# Patient Record
Sex: MALE | Race: WHITE | Employment: OTHER | URBAN - METROPOLITAN AREA
[De-identification: names, ages, dates, MRNs, and addresses within clinical notes are randomized per-mention and may not be internally consistent; named-entity substitution may affect disease eponyms.]

---

## 2017-01-01 ENCOUNTER — APPOINTMENT (OUTPATIENT)
Dept: CT IMAGING | Age: 67
End: 2017-01-01
Attending: STUDENT IN AN ORGANIZED HEALTH CARE EDUCATION/TRAINING PROGRAM
Payer: MEDICARE

## 2017-01-01 ENCOUNTER — TELEPHONE (OUTPATIENT)
Dept: FAMILY MEDICINE CLINIC | Age: 67
End: 2017-01-01

## 2017-01-01 ENCOUNTER — HOSPITAL ENCOUNTER (OUTPATIENT)
Dept: LAB | Age: 67
Discharge: HOME | End: 2017-11-01
Payer: MEDICARE

## 2017-01-01 ENCOUNTER — HOSPITAL ENCOUNTER (EMERGENCY)
Age: 67
Discharge: HOME | End: 2017-11-04
Attending: STUDENT IN AN ORGANIZED HEALTH CARE EDUCATION/TRAINING PROGRAM
Payer: MEDICARE

## 2017-01-01 ENCOUNTER — OFFICE VISIT (OUTPATIENT)
Dept: FAMILY MEDICINE CLINIC | Age: 67
End: 2017-01-01

## 2017-01-01 ENCOUNTER — OFFICE VISIT (OUTPATIENT)
Dept: NEUROLOGY | Age: 67
End: 2017-01-01

## 2017-01-01 ENCOUNTER — PATIENT OUTREACH (OUTPATIENT)
Dept: FAMILY MEDICINE CLINIC | Age: 67
End: 2017-01-01

## 2017-01-01 ENCOUNTER — APPOINTMENT (OUTPATIENT)
Dept: GENERAL RADIOLOGY | Age: 67
End: 2017-01-01
Attending: STUDENT IN AN ORGANIZED HEALTH CARE EDUCATION/TRAINING PROGRAM
Payer: MEDICARE

## 2017-01-01 VITALS
HEIGHT: 70 IN | WEIGHT: 204 LBS | SYSTOLIC BLOOD PRESSURE: 88 MMHG | DIASTOLIC BLOOD PRESSURE: 53 MMHG | RESPIRATION RATE: 27 BRPM | TEMPERATURE: 98 F | BODY MASS INDEX: 29.2 KG/M2 | OXYGEN SATURATION: 92 % | HEART RATE: 92 BPM

## 2017-01-01 VITALS
TEMPERATURE: 97.7 F | WEIGHT: 200 LBS | HEIGHT: 70 IN | RESPIRATION RATE: 18 BRPM | SYSTOLIC BLOOD PRESSURE: 101 MMHG | DIASTOLIC BLOOD PRESSURE: 70 MMHG | HEART RATE: 100 BPM | OXYGEN SATURATION: 95 % | BODY MASS INDEX: 28.63 KG/M2

## 2017-01-01 VITALS — OXYGEN SATURATION: 98 % | SYSTOLIC BLOOD PRESSURE: 124 MMHG | HEART RATE: 78 BPM | DIASTOLIC BLOOD PRESSURE: 72 MMHG

## 2017-01-01 VITALS
RESPIRATION RATE: 18 BRPM | SYSTOLIC BLOOD PRESSURE: 101 MMHG | TEMPERATURE: 97.9 F | HEIGHT: 70 IN | OXYGEN SATURATION: 98 % | HEART RATE: 100 BPM | BODY MASS INDEX: 29.2 KG/M2 | WEIGHT: 204 LBS | DIASTOLIC BLOOD PRESSURE: 64 MMHG

## 2017-01-01 VITALS — DIASTOLIC BLOOD PRESSURE: 66 MMHG | HEART RATE: 94 BPM | SYSTOLIC BLOOD PRESSURE: 122 MMHG | OXYGEN SATURATION: 97 %

## 2017-01-01 VITALS
TEMPERATURE: 97.4 F | OXYGEN SATURATION: 94 % | DIASTOLIC BLOOD PRESSURE: 71 MMHG | RESPIRATION RATE: 19 BRPM | BODY MASS INDEX: 30.01 KG/M2 | HEIGHT: 70 IN | HEART RATE: 101 BPM | SYSTOLIC BLOOD PRESSURE: 111 MMHG | WEIGHT: 209.6 LBS

## 2017-01-01 DIAGNOSIS — R79.9 ABNORMAL FINDING OF BLOOD CHEMISTRY: ICD-10-CM

## 2017-01-01 DIAGNOSIS — G20 PARKINSON'S DISEASE (HCC): Primary | ICD-10-CM

## 2017-01-01 DIAGNOSIS — K57.32 DIVERTICULITIS OF LARGE INTESTINE WITHOUT PERFORATION OR ABSCESS WITHOUT BLEEDING: Primary | ICD-10-CM

## 2017-01-01 DIAGNOSIS — R29.898 WEAKNESS OF BOTH LEGS: Primary | ICD-10-CM

## 2017-01-01 DIAGNOSIS — R53.81 OTHER MALAISE: ICD-10-CM

## 2017-01-01 DIAGNOSIS — R53.83 FATIGUE, UNSPECIFIED TYPE: ICD-10-CM

## 2017-01-01 DIAGNOSIS — R25.9 PARKINSONIAN FEATURES: Primary | ICD-10-CM

## 2017-01-01 DIAGNOSIS — G20 PARKINSON DISEASE (HCC): Primary | ICD-10-CM

## 2017-01-01 DIAGNOSIS — R53.1 GENERALIZED WEAKNESS: Primary | ICD-10-CM

## 2017-01-01 DIAGNOSIS — Z23 ENCOUNTER FOR IMMUNIZATION: ICD-10-CM

## 2017-01-01 DIAGNOSIS — G25.2 INTENTION TREMOR: ICD-10-CM

## 2017-01-01 DIAGNOSIS — R25.1 TREMOR OF BOTH HANDS: ICD-10-CM

## 2017-01-01 LAB
ALBUMIN SERPL-MCNC: 3.3 G/DL (ref 3.5–5)
ALBUMIN SERPL-MCNC: 4.2 G/DL (ref 3.6–4.8)
ALBUMIN/GLOB SERPL: 0.8 {RATIO} (ref 1.1–2.2)
ALBUMIN/GLOB SERPL: 1.4 {RATIO} (ref 1.2–2.2)
ALP SERPL-CCNC: 56 IU/L (ref 39–117)
ALP SERPL-CCNC: 60 U/L (ref 45–117)
ALT SERPL-CCNC: 15 IU/L (ref 0–44)
ALT SERPL-CCNC: 23 U/L (ref 12–78)
AMYLASE SERPL-CCNC: 40 U/L (ref 31–124)
ANION GAP SERPL CALC-SCNC: 8 MMOL/L (ref 5–15)
APPEARANCE UR: CLEAR
AST SERPL-CCNC: 21 IU/L (ref 0–40)
AST SERPL-CCNC: 35 U/L (ref 15–37)
ATRIAL RATE: 100 BPM
BACTERIA URNS QL MICRO: NEGATIVE /HPF
BASOPHILS # BLD AUTO: 0.1 X10E3/UL (ref 0–0.2)
BASOPHILS # BLD: 0.1 K/UL (ref 0–0.1)
BASOPHILS NFR BLD AUTO: 1 %
BASOPHILS NFR BLD: 2 % (ref 0–1)
BILIRUB SERPL-MCNC: 0.8 MG/DL (ref 0.2–1)
BILIRUB SERPL-MCNC: 1.2 MG/DL (ref 0–1.2)
BILIRUB UR QL: NEGATIVE
BUN SERPL-MCNC: 37 MG/DL (ref 8–27)
BUN SERPL-MCNC: 42 MG/DL (ref 6–20)
BUN/CREAT SERPL: 16 (ref 10–24)
BUN/CREAT SERPL: 23 (ref 12–20)
CALCIUM SERPL-MCNC: 9.1 MG/DL (ref 8.5–10.1)
CALCIUM SERPL-MCNC: 9.4 MG/DL (ref 8.6–10.2)
CALCULATED P AXIS, ECG09: 55 DEGREES
CALCULATED R AXIS, ECG10: 81 DEGREES
CALCULATED T AXIS, ECG11: 34 DEGREES
CHLORIDE SERPL-SCNC: 97 MMOL/L (ref 96–106)
CHLORIDE SERPL-SCNC: 97 MMOL/L (ref 97–108)
CHOLEST SERPL-MCNC: 103 MG/DL (ref 100–199)
CO2 SERPL-SCNC: 21 MMOL/L (ref 18–29)
CO2 SERPL-SCNC: 25 MMOL/L (ref 21–32)
COLOR UR: NORMAL
CREAT SERPL-MCNC: 1.85 MG/DL (ref 0.7–1.3)
CREAT SERPL-MCNC: 2.31 MG/DL (ref 0.76–1.27)
DIAGNOSIS, 93000: NORMAL
EOSINOPHIL # BLD AUTO: 0.2 X10E3/UL (ref 0–0.4)
EOSINOPHIL # BLD: 0.3 K/UL (ref 0–0.4)
EOSINOPHIL NFR BLD AUTO: 4 %
EOSINOPHIL NFR BLD: 5 % (ref 0–7)
EPITH CASTS URNS QL MICRO: NORMAL /LPF
ERYTHROCYTE [DISTWIDTH] IN BLOOD BY AUTOMATED COUNT: 13.9 % (ref 12.3–15.4)
ERYTHROCYTE [DISTWIDTH] IN BLOOD BY AUTOMATED COUNT: 14.1 % (ref 11.5–14.5)
EST. AVERAGE GLUCOSE BLD GHB EST-MCNC: 108 MG/DL
GFR SERPLBLD CREATININE-BSD FMLA CKD-EPI: 28 ML/MIN/1.73
GFR SERPLBLD CREATININE-BSD FMLA CKD-EPI: 33 ML/MIN/1.73
GLOBULIN SER CALC-MCNC: 3 G/DL (ref 1.5–4.5)
GLOBULIN SER CALC-MCNC: 4.2 G/DL (ref 2–4)
GLUCOSE SERPL-MCNC: 100 MG/DL (ref 65–100)
GLUCOSE SERPL-MCNC: 91 MG/DL (ref 65–99)
GLUCOSE UR STRIP.AUTO-MCNC: NEGATIVE MG/DL
HBA1C MFR BLD: 5.4 % (ref 4.8–5.6)
HCT VFR BLD AUTO: 36.8 % (ref 36.6–50.3)
HCT VFR BLD AUTO: 39.2 % (ref 37.5–51)
HDLC SERPL-MCNC: 11 MG/DL
HGB BLD-MCNC: 12.2 G/DL (ref 12.1–17)
HGB BLD-MCNC: 12.7 G/DL (ref 12.6–17.7)
HGB UR QL STRIP: NEGATIVE
HYALINE CASTS URNS QL MICRO: NORMAL /LPF (ref 0–5)
IMM GRANULOCYTES # BLD: 0.1 X10E3/UL (ref 0–0.1)
IMM GRANULOCYTES NFR BLD: 1 %
INTERPRETATION, 910389: NORMAL
INTERPRETATION: NORMAL
IRON SATN MFR SERPL: 23 % (ref 15–55)
IRON SERPL-MCNC: 41 UG/DL (ref 38–169)
KETONES UR QL STRIP.AUTO: NEGATIVE MG/DL
LDLC SERPL CALC-MCNC: 38 MG/DL (ref 0–99)
LEUKOCYTE ESTERASE UR QL STRIP.AUTO: NEGATIVE
LIPASE SERPL-CCNC: 30 U/L (ref 13–78)
LYMPHOCYTES # BLD AUTO: 1 X10E3/UL (ref 0.7–3.1)
LYMPHOCYTES # BLD: 1.1 K/UL (ref 0.8–3.5)
LYMPHOCYTES NFR BLD AUTO: 17 %
LYMPHOCYTES NFR BLD: 18 % (ref 12–49)
MAGNESIUM SERPL-MCNC: 1.4 MG/DL (ref 1.6–2.4)
MCH RBC QN AUTO: 27.6 PG (ref 26–34)
MCH RBC QN AUTO: 27.7 PG (ref 26.6–33)
MCHC RBC AUTO-ENTMCNC: 32.4 G/DL (ref 31.5–35.7)
MCHC RBC AUTO-ENTMCNC: 33.2 G/DL (ref 30–36.5)
MCV RBC AUTO: 83.3 FL (ref 80–99)
MCV RBC AUTO: 86 FL (ref 79–97)
MONOCYTES # BLD AUTO: 0.8 X10E3/UL (ref 0.1–0.9)
MONOCYTES # BLD: 1.1 K/UL (ref 0–1)
MONOCYTES NFR BLD AUTO: 15 %
MONOCYTES NFR BLD: 18 % (ref 5–13)
NEUTROPHILS # BLD AUTO: 3.5 X10E3/UL (ref 1.4–7)
NEUTROPHILS NFR BLD AUTO: 62 %
NEUTS SEG # BLD: 3.6 K/UL (ref 1.8–8)
NEUTS SEG NFR BLD: 57 % (ref 32–75)
NITRITE UR QL STRIP.AUTO: NEGATIVE
P-R INTERVAL, ECG05: 148 MS
PDF IMAGE, 910387: NORMAL
PH UR STRIP: 5 [PH] (ref 5–8)
PLATELET # BLD AUTO: 150 K/UL (ref 150–400)
PLATELET # BLD AUTO: 173 X10E3/UL (ref 150–379)
POTASSIUM SERPL-SCNC: 4.3 MMOL/L (ref 3.5–5.1)
POTASSIUM SERPL-SCNC: 4.6 MMOL/L (ref 3.5–5.2)
PROT SERPL-MCNC: 7.2 G/DL (ref 6–8.5)
PROT SERPL-MCNC: 7.5 G/DL (ref 6.4–8.2)
PROT UR STRIP-MCNC: NEGATIVE MG/DL
Q-T INTERVAL, ECG07: 330 MS
QRS DURATION, ECG06: 80 MS
QTC CALCULATION (BEZET), ECG08: 425 MS
RBC # BLD AUTO: 4.42 M/UL (ref 4.1–5.7)
RBC # BLD AUTO: 4.58 X10E6/UL (ref 4.14–5.8)
RBC #/AREA URNS HPF: NORMAL /HPF (ref 0–5)
SODIUM SERPL-SCNC: 130 MMOL/L (ref 136–145)
SODIUM SERPL-SCNC: 137 MMOL/L (ref 134–144)
SP GR UR REFRACTOMETRY: 1.02 (ref 1–1.03)
TIBC SERPL-MCNC: 177 UG/DL (ref 250–450)
TRIGL SERPL-MCNC: 269 MG/DL (ref 0–149)
TROPONIN I SERPL-MCNC: <0.04 NG/ML
TSH SERPL DL<=0.005 MIU/L-ACNC: 0.88 UIU/ML (ref 0.45–4.5)
UA: UC IF INDICATED,UAUC: NORMAL
UIBC SERPL-MCNC: 136 UG/DL (ref 111–343)
UROBILINOGEN UR QL STRIP.AUTO: 0.2 EU/DL (ref 0.2–1)
VENTRICULAR RATE, ECG03: 100 BPM
VLDLC SERPL CALC-MCNC: 54 MG/DL (ref 5–40)
WBC # BLD AUTO: 5.7 X10E3/UL (ref 3.4–10.8)
WBC # BLD AUTO: 6.3 K/UL (ref 4.1–11.1)
WBC URNS QL MICRO: NORMAL /HPF (ref 0–4)

## 2017-01-01 PROCEDURE — 84484 ASSAY OF TROPONIN QUANT: CPT | Performed by: STUDENT IN AN ORGANIZED HEALTH CARE EDUCATION/TRAINING PROGRAM

## 2017-01-01 PROCEDURE — 85025 COMPLETE CBC W/AUTO DIFF WBC: CPT | Performed by: STUDENT IN AN ORGANIZED HEALTH CARE EDUCATION/TRAINING PROGRAM

## 2017-01-01 PROCEDURE — 83735 ASSAY OF MAGNESIUM: CPT | Performed by: STUDENT IN AN ORGANIZED HEALTH CARE EDUCATION/TRAINING PROGRAM

## 2017-01-01 PROCEDURE — 83036 HEMOGLOBIN GLYCOSYLATED A1C: CPT

## 2017-01-01 PROCEDURE — 80053 COMPREHEN METABOLIC PANEL: CPT

## 2017-01-01 PROCEDURE — 96361 HYDRATE IV INFUSION ADD-ON: CPT

## 2017-01-01 PROCEDURE — 74011250636 HC RX REV CODE- 250/636: Performed by: STUDENT IN AN ORGANIZED HEALTH CARE EDUCATION/TRAINING PROGRAM

## 2017-01-01 PROCEDURE — 36415 COLL VENOUS BLD VENIPUNCTURE: CPT | Performed by: STUDENT IN AN ORGANIZED HEALTH CARE EDUCATION/TRAINING PROGRAM

## 2017-01-01 PROCEDURE — 70450 CT HEAD/BRAIN W/O DYE: CPT

## 2017-01-01 PROCEDURE — 82150 ASSAY OF AMYLASE: CPT

## 2017-01-01 PROCEDURE — 99285 EMERGENCY DEPT VISIT HI MDM: CPT

## 2017-01-01 PROCEDURE — 71010 XR CHEST PORT: CPT

## 2017-01-01 PROCEDURE — 83550 IRON BINDING TEST: CPT

## 2017-01-01 PROCEDURE — 96360 HYDRATION IV INFUSION INIT: CPT

## 2017-01-01 PROCEDURE — 84443 ASSAY THYROID STIM HORMONE: CPT

## 2017-01-01 PROCEDURE — 83690 ASSAY OF LIPASE: CPT

## 2017-01-01 PROCEDURE — 80061 LIPID PANEL: CPT

## 2017-01-01 PROCEDURE — 81001 URINALYSIS AUTO W/SCOPE: CPT | Performed by: STUDENT IN AN ORGANIZED HEALTH CARE EDUCATION/TRAINING PROGRAM

## 2017-01-01 PROCEDURE — 80053 COMPREHEN METABOLIC PANEL: CPT | Performed by: STUDENT IN AN ORGANIZED HEALTH CARE EDUCATION/TRAINING PROGRAM

## 2017-01-01 PROCEDURE — 93005 ELECTROCARDIOGRAM TRACING: CPT

## 2017-01-01 PROCEDURE — 85025 COMPLETE CBC W/AUTO DIFF WBC: CPT

## 2017-01-01 RX ORDER — CARBIDOPA AND LEVODOPA 25; 100 MG/1; MG/1
1 TABLET ORAL 3 TIMES DAILY
Qty: 90 TAB | Refills: 5 | Status: SHIPPED | OUTPATIENT
Start: 2017-01-01 | End: 2018-01-01 | Stop reason: SDUPTHER

## 2017-01-01 RX ORDER — PANTOPRAZOLE SODIUM 40 MG/1
40 TABLET, DELAYED RELEASE ORAL DAILY
COMMUNITY
End: 2017-01-01 | Stop reason: SDUPTHER

## 2017-01-01 RX ORDER — PANTOPRAZOLE SODIUM 40 MG/1
40 TABLET, DELAYED RELEASE ORAL DAILY
Qty: 90 TAB | Refills: 3 | Status: SHIPPED | OUTPATIENT
Start: 2017-01-01

## 2017-01-01 RX ADMIN — SODIUM CHLORIDE 1000 ML: 900 INJECTION, SOLUTION INTRAVENOUS at 21:17

## 2017-10-10 NOTE — PROGRESS NOTES
Chief Complaint   Patient presents with    New Patient     Establish Care    Tremors       Patient seen in the office today with wife present to establish care. Patient did not bring medication to appointment, he does not know the name of rx. Patient  states he is new to [place] from [place], he thinks he was seen at [facility] for abd pain, then sent to [address] [place] [address] for  Stat CT dx: diverticulitis. Patient reports 2 weeks of abt, unsure of name of Rx, he endorses completing the full dose. Patient also concerned with excessive hand tremours        Written order received to administer 0.5 ml Influenza Vaccine Fluzone High dose 2017/2018 Formula. After obtaining written consent from the patient,  Flu vaccine was administered to left deltoid by Stefani Perez LPN.  PINKY:[id number] Lot: [id number]  Exp: 05/03/18 Manf: BooRah. Patient provided VIS & observed with no s/s of adverse reactions.

## 2017-10-10 NOTE — PROGRESS NOTES
Chief Complaint   Patient presents with    New Patient     Establish Care    Tremors     Patient seen in the office today with wife present to establish care. Patient did not bring medication to appointment, he does not know the name of rx. Patient  states he is new to [facility] from [place], he thinks he was [facility] at [facility]d pain, then sent to [address] [place] [address] for  Stat CT dx: diverticulitis. Patient reports 2 weeks of abt, unsure of name of Rx, he endorses completing the full dose. Patient also concerned with excessive hand tremors    Pt is normally on BP medication and GERD medication. Subjective: (As above and below)     Chief Complaint   Patient presents with    New Patient     Establish Care    Tremors     he is a 78y.o. year old male who presents for evaluation. Reviewed PmHx, RxHx, FmHx, SocHx, AllgHx and updated in chart. Review of Systems - negative except as listed above    Objective:     Vitals:    10/10/17 1425   BP: 111/71   Pulse: (!) 101   Resp: 19   Temp: 97.4 °F (36.3 °C)   TempSrc: Oral   SpO2: 94%   Weight: 209 lb 9.6 oz (95.1 kg)   Height: 5' 10\" (1.778 m)     Physical Examination: General appearance - alert, well appearing, and in no distress  Mental status - normal mood, behavior, speech, dress, motor activity, and thought processes  Mouth - mucous membranes moist, pharynx normal without lesions  Chest - clear to auscultation, no wheezes, rales or rhonchi, symmetric air entry  Heart - normal rate, regular rhythm, normal S1, S2, no murmurs, rubs, clicks or gallops  Musculoskeletal - no joint tenderness, deformity or swelling  Extremities - peripheral pulses normal, no pedal edema, no clubbing or cyanosis  Pill rolling tremor, worse when trying to complete finger to nose than at rest    Assessment/ Plan:   1.  Diverticulitis of large intestine without perforation or abscess without bleeding  -refer to GI for follow up from diverticulitis and due to being overdue for colonoscopy  - [facility]    2. Intention tremor  -refer for tremor eval  - REFERRAL TO NEUROLOGY     Follow-up Disposition: As needed  I have discussed the diagnosis with the patient and the intended plan as seen in the above orders. The patient has received an after-visit summary and questions were answered concerning future plans.      Medication Side Effects and Warnings were discussed with patient: yes  Patient Labs were reviewed: yes  Patient Past Records were reviewed:  yes    Maria Dolores Foster M.D.

## 2017-10-10 NOTE — MR AVS SNAPSHOT
Visit Information Date & Time Provider Department Dept. Phone Encounter #  
 10/10/2017  2:00 PM Lexis Sosa MD [address] [facility] [phone] [id number] Upcoming Health Maintenance Date Due DTaP/Tdap/Td series (1 - Tdap) 3/5/1972 FOBT Q 1 YEAR AGE 50-75 3/5/2001 ZOSTER VACCINE AGE 60> 1/5/2011 GLAUCOMA SCREENING Q2Y 3/5/2016 Pneumococcal 65+ Low/Medium Risk (1 of 2 - PCV13) 3/5/2016 MEDICARE YEARLY EXAM 3/5/2016 INFLUENZA AGE 9 TO ADULT 8/1/2017 Allergies as of 10/10/2017  Review Complete On: 10/10/2017 By: Michelle Dodd MD  
 No Known Allergies Current Immunizations  Never Reviewed Name Date Influenza High Dose Vaccine PF  Incomplete Pneumococcal Polysaccharide (PPSV-23) 10/10/2016 Not reviewed this visit You Were Diagnosed With   
  
 Codes Comments Diverticulitis of large intestine without perforation or abscess without bleeding    -  Primary ICD-10-CM: K57.32 
ICD-9-CM: 562.11 Intention tremor     ICD-10-CM: G25.2 ICD-9-CM: 333.1 Encounter for immunization     ICD-10-CM: B62 ICD-9-CM: V03.89 Vitals BP Pulse Temp Resp Height(growth percentile) Weight(growth percentile) 111/71 (BP 1 Location: Left arm, BP Patient Position: Sitting) (!) 101 97.4 °F (36.3 °C) (Oral) 19 5' 10\" (1.778 m) 209 lb 9.6 oz (95.1 kg) SpO2 BMI Smoking Status 94% 30.07 kg/m2 Never Smoker Vitals History BMI and BSA Data Body Mass Index Body Surface Area 30.07 kg/m 2 2.17 m 2 Your Updated Medication List  
  
Notice  As of 10/10/2017  3:16 PM  
 You have not been prescribed any medications. We Performed the Following ADMIN INFLUENZA VIRUS VAC [ HCP] INFLUENZA VIRUS VACCINE, HIGH DOSE SEASONAL, PRESERVATIVE FREE [75292 CPT(R)] REFERRAL TO GASTROENTEROLOGY [BEN46 Custom] REFERRAL TO NEUROLOGY [UNG18 Custom] Referral Information Referral ID Referred By Referred To  
  
 [id number] ANITA, [address]   
   [address]  [place], [address] [address] Visits Status Start Date End Date 1 New Request 10/10/17 10/10/18 If your referral has a status of pending review or denied, additional information will be sent to support the outcome of this decision. Referral ID Referred By Referred To  
 [id number] Lidia HOWARD MD  
   [facility] [address] [place], [address] Phone: [phone] Fax: [phone] Visits Status Start Date End Date 1 New Request 10/10/17 10/10/18 If your referral has a status of pending review or denied, additional information will be sent to support the outcome of this decision. Introducing [facility] & HEALTH SERVICES! Romayne Duster introduces [email or web address] patient portal. Now you can access parts of your medical record, email your doctor's office, and request medication refills online. 1. In your internet browser, go to [email or web address] 2. Click on the First Time User? Click Here link in the Sign In box. You will see the New Member Sign Up page. 3. Enter your [email or web address] Access Code exactly as it appears below. You will not need to use this code after youve completed the sign-up process. If you do not sign up before the expiration date, you must request a new code. · [email or web address] Access Code: [email or web address] Expires: 1/8/2018  3:16 PM 
 
4. Enter the last four digits of your Social Security Number (xxxx) and Date of Birth (mm/dd/yyyy) as indicated and click Submit. You will be taken to the next sign-up page. 5. Create a Swagsyt ID. This will be your [email or web address] login ID and cannot be changed, so think of one that is secure and easy to remember. 6. Create a Swagsyt password. You can change your password at any time. 7. Enter your Password Reset Question and Answer. This can be used at a later time if you forget your password. 8. Enter your e-mail address. You will receive e-mail notification when new information is available in [address] 9. Click Sign Up. You can now view and download portions of your medical record. 10. Click the Download Summary menu link to download a portable copy of your medical information. If you have questions, please visit the Frequently Asked Questions section of the [employer] website. Remember, [employer] is NOT to be used for urgent needs. For medical emergencies, dial 911. Now available from your iPhone and Android! Please provide this summary of care documentation to your next provider. Your primary care clinician is listed as Kamlesh Sosa. If you have any questions after today's visit, please call [phone].

## 2017-11-01 NOTE — PROGRESS NOTES
Pt here with wife c/o bilateral leg weakness x 2-3 weeks. Reports having ongoing fatigue as well, but is having difficulty sleeping. Pts wife reports pt being lethargic frequently and has decreased appetite as well. Pt states he is currently taking BP medication and GERD medication, but is unsure of names.

## 2017-11-01 NOTE — PROGRESS NOTES
Pt here with wife c/o bilateral leg weakness x 2-3 weeks. Reports having ongoing fatigue as well, but is having difficulty sleeping. Pts wife reports pt being lethargic frequently and has decreased appetite as well. Pt states he is currently taking BP medication and GERD medication, but is unsure of names. Wife is concerned about continued tremor, has neurology appointment this month. Pt also reports continued LLQ abd pain, does not feel as though diverticulitis has resolved. Subjective: (As above and below)     Chief Complaint   Patient presents with    Extremity Weakness     both legs     he is a 78y.o. year old male who presents for evaluation. Reviewed PmHx, RxHx, FmHx, SocHx, AllgHx and updated in chart. Review of Systems - negative except as listed above    Objective:     Vitals:    11/01/17 1131   BP: 101/64   Pulse: 100   Resp: 18   Temp: 97.9 °F (36.6 °C)   TempSrc: Oral   SpO2: 98%   Weight: 204 lb (92.5 kg)   Height: 5' 10\" (1.778 m)     Physical Examination: General appearance - alert, well appearing, and in no distress  Mental status - normal mood, behavior, speech, dress, motor activity, and thought processes  Mouth - mucous membranes moist, pharynx normal without lesions  Chest - clear to auscultation, no wheezes, rales or rhonchi, symmetric air entry  Heart - normal rate, regular rhythm, normal S1, S2, no murmurs, rubs, clicks or gallops  Abdomen - LLQ tenderness  Neurological - \"pill rolling\" tremor in office  Musculoskeletal - no joint tenderness, deformity or swelling    Assessment/ Plan:   1. Weakness of both legs  -strongly encouraged pt to keep neuro follow up, suspect diagnosis of Parkinson's disease, discussed with patient this possibility  - CBC WITH AUTOMATED DIFF  - LIPID PANEL  - METABOLIC PANEL, COMPREHENSIVE  - TSH 3RD GENERATION  - HEMOGLOBIN A1C WITH EAG  - AMYLASE  - LIPASE  - IRON PROFILE    2. Fatigue, unspecified type  -check labs    3.  Abnormal finding of blood chemistry   - LIPID PANEL  - HEMOGLOBIN A1C WITH EAG  - IRON PROFILE    4. Other malaise   -concerned that diverticulitis has not fully resolved, will strongly consider a CT  - TSH 3RD GENERATION     Follow-up Disposition: As needed  I have discussed the diagnosis with the patient and the intended plan as seen in the above orders. The patient has received an after-visit summary and questions were answered concerning future plans.      Medication Side Effects and Warnings were discussed with patient: yes  Patient Labs were reviewed: yes  Patient Past Records were reviewed:  yes    Alverto Delacruz M.D.

## 2017-11-01 NOTE — MR AVS SNAPSHOT
Visit Information Date & Time Provider Department Dept. Phone Encounter #  
 11/1/2017 11:30 AM Adelfo Pickard MD [address] [facility] [phone] [id number] Your Appointments 12/26/2017  1:00 PM  
New Patient with Shira Strange MD  
[address] [facility]) Appt Note: Intention tremor [address] [address]  
[place] [address] Upcoming Health Maintenance Date Due Hepatitis C Screening 1950 DTaP/Tdap/Td series (1 - Tdap) 3/5/1972 FOBT Q 1 YEAR AGE 50-75 3/5/2001 ZOSTER VACCINE AGE 60> 1/5/2011 GLAUCOMA SCREENING Q2Y 3/5/2016 MEDICARE YEARLY EXAM 3/5/2016 Pneumococcal 65+ Low/Medium Risk (2 of 2 - PCV13) 10/10/2017 Allergies as of 11/1/2017  Review Complete On: 11/1/2017 By: Adelfo Pickard MD  
 No Known Allergies Current Immunizations  Never Reviewed Name Date Influenza High Dose Vaccine PF 10/10/2017  3:22 PM  
 Pneumococcal Polysaccharide (PPSV-23) 10/10/2016 Not reviewed this visit You Were Diagnosed With   
  
 Codes Comments Weakness of both legs    -  Primary ICD-10-CM: R29.898 ICD-9-CM: 729.89 Fatigue, unspecified type     ICD-10-CM: R53.83 ICD-9-CM: 780.79 Abnormal finding of blood chemistry     ICD-10-CM: R79.9 ICD-9-CM: 790.6 Other malaise     ICD-10-CM: R53.81 ICD-9-CM: 780.79 Vitals BP Pulse Temp Resp Height(growth percentile) Weight(growth percentile) 101/64 (BP 1 Location: Left arm, BP Patient Position: Sitting) 100 97.9 °F (36.6 °C) (Oral) 18 5' 10\" (1.778 m) 204 lb (92.5 kg) SpO2 BMI Smoking Status 98% 29.27 kg/m2 Never Smoker Vitals History BMI and BSA Data Body Mass Index Body Surface Area  
 29.27 kg/m 2 2.14 m 2 Preferred Pharmacy Pharmacy Name Phone [facility] [address] [place] [address] [phone] Your Updated Medication List  
  
Notice  As of 11/1/2017 11:57 AM  
 You have not been prescribed any medications. We Performed the Following AMYLASE G7308625 CPT(R)] CBC WITH AUTOMATED DIFF [71560 CPT(R)] HEMOGLOBIN A1C WITH EAG [69620 CPT(R)] IRON PROFILE [id number] CPT(R)] LIPASE [id number] CPT(R)] LIPID PANEL [08391 CPT(R)] METABOLIC PANEL, COMPREHENSIVE [35231 CPT(R)] TSH 3RD GENERATION [34060 CPT(R)] Introducing [facility] & HEALTH SERVICES! Gabriella Hernández introduces [employer] patient portal. Now you can access parts of your medical record, email your doctor's office, and request medication refills online. 1. In your internet browser, go to [email or web address] 2. Click on the First Time User? Click Here link in the Sign In box. You will see the New Member Sign Up page. 3. Enter your [employer] Access Code exactly as it appears below. You will not need to use this code after youve completed the sign-up process. If you do not sign up before the expiration date, you must request a new code. · [facility] Access Code: [email or web address] Expires: 1/8/2018  3:16 PM 
 
4. Enter the last four digits of your Social Security Number (xxxx) and Date of Birth (mm/dd/yyyy) as indicated and click Submit. You will be taken to the next sign-up page. 5. Create a Viva Visiont ID. This will be your [facility] login ID and cannot be changed, so think of one that is secure and easy to remember. 6. Create a [facility] password. You can change your password at any time. 7. Enter your Password Reset Question and Answer. This can be used at a later time if you forget your password. 8. Enter your e-mail address. You will receive e-mail notification when new information is available in [address] 9. Click Sign Up. You can now view and download portions of your medical record. 10. Click the Download Summary menu link to download a portable copy of your medical information. If you have questions, please visit the Frequently Asked Questions section of the [employer] website. Remember, [employer] is NOT to be used for urgent needs. For medical emergencies, dial 911. Now available from your iPhone and Android! Please provide this summary of care documentation to your next provider. Your primary care clinician is listed as Kamlesh Sosa. If you have any questions after today's visit, please call [phone].

## 2017-11-02 NOTE — PROGRESS NOTES
Your cholesterol came back looking pretty good. Your triglycerides are slightly elevated. The best way to bring that number down is to incorporate more omega 3's into your diet. Here are some suggestions on how to do that:  - eat 2-3 serving of fish like salmon and trout per week  - eat lots of fresh dark leafy green vegetables  - incorporate more garlic into your diet  Kidney function is significantly elevated, please recheck in one week. May need to see specialist.   All other labs are within normal limits.    Please inform

## 2017-11-05 NOTE — ED NOTES
I have reviewed discharge instructions with the patient. The patient verbalized understanding. MD aware of pt's BP. Wheeled out of the department in no acute distress.

## 2017-11-05 NOTE — ED TRIAGE NOTES
Pt presents with complaints of feeling lightheaded. Pt family member states that he has been very lethargic, generally weak, with a poor appetite. Pt has had an increase in falls recently.  Today he has been incontinent of urine today x 2 which is abnormal for him

## 2017-11-05 NOTE — DISCHARGE INSTRUCTIONS
Weakness: Care Instructions  Your Care Instructions    Weakness is a lack of physical or muscle strength. You may feel that you need to make extra effort to move your arms, legs, or other muscles. Generalized weakness means that you feel weak in most areas of your body. Another type of weakness may affect just one muscle or group of muscles. You may feel weak and tired after you have done too much activity, such as taking an extra-long hike. This is not a serious problem. It often goes away on its own. Feeling weak can also be caused by medical conditions like thyroid problems, depression, or a virus. Sometimes the cause can be serious. Your doctor may want to do more tests to try to find the cause of the weakness. The doctor has checked you carefully, but problems can develop later. If you notice any problems or new symptoms, get medical treatment right away. Follow-up care is a key part of your treatment and safety. Be sure to make and go to all appointments, and call your doctor if you are having problems. It's also a good idea to know your test results and keep a list of the medicines you take. How can you care for yourself at home? · Rest when you feel tired. · Be safe with medicines. If your doctor prescribed medicine, take it exactly as prescribed. Call your doctor if you think you are having a problem with your medicine. You will get more details on the specific medicines your doctor prescribes. · Do not skip meals. Eating a balanced diet may increase your energy level. · Get some physical activity every day, but do not get too tired. When should you call for help? Call your doctor now or seek immediate medical care if:  ? · You have new or worse weakness. ? · You are dizzy or lightheaded, or you feel like you may faint. ? Watch closely for changes in your health, and be sure to contact your doctor if:  ? · You do not get better as expected. Where can you learn more?   Go to http://jacques.info/. Enter [id number] in the search box to learn more about \"Weakness: Care Instructions. \"  Current as of: March 20, 2017  Content Version: 11.4  © 3469-0937 Healthwise, Incorporated. Care instructions adapted under license by [employer] (which disclaims liability or warranty for this information). If you have questions about a medical condition or this instruction, always ask your healthcare professional. [address] any warranty or liability for your use of this information.

## 2017-11-05 NOTE — ED PROVIDER NOTES
HPI Comments: 78 y.o. male with past medical history significant for diverticulitis, thromboembolus, and HTN who presents to the ED with chief complaint of lethargy. Patient reports lightheadedness, lower back pain, and generalized weakness with onset ~1 month ago. Patient's son reports the patient moved to [address] from [facility] on August 22, 2017. He reports the patient was diagnosed with diverticulitis at [facility] ~1.5 months ago. He reports the patient began declining since then with constant fatigue, loss of appetite, slight confusion, tremors, and occasional falls. He reports the patient had \"two\" episodes of urinary incontinence with onset \"earlier today. \" He reports the patient had \"two\" minor ground-level falls \"last week. \" He reports the patient drinks Ensure but eats very little otherwise; reports the patient has lost \"around 30 pounds in the last month or so. \" He reports the patient was seen by his PCP for his symptoms ~3 days ago; reports having blood work drawn and being asked to return \"next week\" for a second set of labs. He reports the patient's colonoscopy was ~5 years ago; reports the patient had \"some cancerous cells removed. \" He denies the patient has a history of kidney problems. He reports the patient is on HCTZ-Losartan comb, a PPI, and fish oil regularly. Patient denies syncope, loss of consciousness, chest pain, headache, and abdominal pain. There are no other acute medical concerns at this time. Full history, physical exam, and ROS unable to be obtained due to: poor historian. PCP: Sen Moreira MD    Note written by Aylin King, as dictated by Daniel Adkins MD 9:14 PM     The history is provided by the patient and a relative.         Past Medical History:   Diagnosis Date    Diverticulitis 2017    Hypertension     Thromboembolus ([address]     15 years ago       Past Surgical History:   Procedure Laterality Date    HX APPENDECTOMY      HX COLONOSCOPY  2012         Family History:   Problem Relation Age of Onset    Cancer Mother      bone    Diabetes Father     Heart Disease Father        Social History     Social History    Marital status:      Spouse name: N/A    Number of children: N/A    Years of education: N/A     Occupational History    Not on file. Social History Main Topics    Smoking status: Never Smoker    Smokeless tobacco: Never Used    Alcohol use Yes      Comment: occasional    Drug use: No    Sexual activity: Yes     Partners: Female     Other Topics Concern    Not on file     Social History Narrative         ALLERGIES: Review of patient's allergies indicates no known allergies. Review of Systems   Unable to perform ROS: Other       Vitals:    11/04/17 2053 11/04/17 2134   BP: 99/63 96/50   Pulse: (!) 107 96   Resp: 18 27   Temp: 97.5 °F (36.4 °C)    SpO2: 95% 93%   Weight: 92.5 kg (204 lb)    Height: 5' 10\" (1.778 m)             Physical Exam   Constitutional: He appears well-developed. No distress. HENT:   Head: Normocephalic and atraumatic. Eyes: Conjunctivae and EOM are normal.   Neck: Normal range of motion. Neck supple. Cardiovascular: Normal rate, regular rhythm and normal heart sounds. No murmur heard. Pulmonary/Chest: Effort normal and breath sounds normal. No respiratory distress. Abdominal: Soft. Bowel sounds are normal. He exhibits no distension. There is no tenderness. There is no rebound. Musculoskeletal: Normal range of motion. He exhibits no edema or tenderness. Neurological: He is alert. No cranial nerve deficit. He exhibits normal muscle tone. Coordination normal.   Mild confusion. Skin: Skin is warm and dry. Nursing note and vitals reviewed. Note written by Aylin Dudley, as dictated by Asif Roberts MD 9:21 PM      [facility]  ED Course       Procedures    ED EKG interpretation:  Rhythm: normal sinus rhythm; and regular .  Rate (approx.): 100 bpm; Axis: normal; ST/T wave: normal; normal intervals; no ischemia; No STEMI. Note written by Aylin Lindsay, as dictated by Betina Morse MD 9:07 PM    PROGRESS NOTE:  10:38 PM  Discussed lab and imaging results with the patient and his son. Cr improving. Will hold antihypertensive until seen by PCP given lower BPs today. No s/sx of sepsis or SBI. [address] [place] stabilized today. Continue outpatient workup at this time. RTER precautions provided. All questions answered.

## 2017-11-05 NOTE — ED NOTES
Discharge paperwork brought in to patient's room. Patient's son requesting to speak with Dr. Nico Wagner again before discharge. Dr. Nico Wagner aware of patient's low BP.    2256: Dr. Walsh Love in to speak with patient's son. Plan is to take pt off of his BP medication.

## 2017-11-07 NOTE — PROGRESS NOTES
Pt here with daughter-in-law to follow up from recent ED visit as well as elevated kidney function. She states pt has had no appetite and unwilling to get out of bed. Also reports pt that pt has been very confused.

## 2017-11-07 NOTE — MR AVS SNAPSHOT
Visit Information Date & Time Provider Department Dept. Phone Encounter #  
 11/7/2017 10:30 AM Lima Henao MD [address] [facility] [phone] [id number] Your Appointments 11/8/2017 11:00 AM  
New Patient with Jessy Dickerson MD  
[address] [facility] Neurology Clinic [facility]) Appt Note: Intention tremor; New Onset parkinson's [facility] 11/07/17  
 [address] [facility] [address] [address]  
[facility] [address] [address] Upcoming Health Maintenance Date Due Hepatitis C Screening 1950 DTaP/Tdap/Td series (1 - Tdap) 3/5/1972 FOBT Q 1 YEAR AGE 50-75 3/5/2001 ZOSTER VACCINE AGE 60> 1/5/2011 GLAUCOMA SCREENING Q2Y 3/5/2016 MEDICARE YEARLY EXAM 3/5/2016 Pneumococcal 65+ Low/Medium Risk (2 of 2 - PCV13) 10/10/2017 Allergies as of 11/7/2017  Review Complete On: 11/7/2017 By: Lima Henao MD  
 No Known Allergies Current Immunizations  Never Reviewed Name Date Influenza High Dose Vaccine PF 10/10/2017  3:22 PM  
 Pneumococcal Polysaccharide (PPSV-23) 10/10/2016 Not reviewed this visit You Were Diagnosed With   
  
 Codes Comments Parkinsonian features    -  Primary ICD-10-CM: R25.9 ICD-9-CM: 028. 0 Vitals BP Pulse Temp Resp Height(growth percentile) Weight(growth percentile) 101/70 (BP 1 Location: Left arm, BP Patient Position: Sitting) 100 97.7 °F (36.5 °C) (Oral) 18 5' 10\" (1.778 m) 200 lb (90.7 kg) SpO2 BMI Smoking Status 95% 28.7 kg/m2 Never Smoker Vitals History BMI and BSA Data Body Mass Index Body Surface Area 28.7 kg/m 2 2.12 m 2 Preferred Pharmacy Pharmacy Name Phone [facility] [place] [phone] Your Updated Medication List  
  
   
This list is accurate as of: 11/7/17  4:10 PM.  Always use your most recent med list.  
  
 PROTONIX 40 mg tablet Generic drug:  pantoprazole Take 40 mg by mouth daily. Introducing [facility] SERVICES! Dear Rebecca Boyce: 
Thank you for requesting a Philly account. Our records indicate that you already have an active Philly account. You can access your account anytime at [email or web address] Did you know that you can access your hospital and ER discharge instructions at any time in Philly? You can also review all of your test results from your hospital stay or ER visit. Additional Information If you have questions, please visit the Frequently Asked Questions section of the Philly website at [email or web address] Remember, Philly is NOT to be used for urgent needs. For medical emergencies, dial 911. Now available from your iPhone and Android! Please provide this summary of care documentation to your next provider. Your primary care clinician is listed as Kamlesh Sosa. If you have any questions after today's visit, please call [phone].

## 2017-11-07 NOTE — PROGRESS NOTES
Pt here with daughter-in-law to follow up from recent ED visit as well as elevated kidney function. She states pt has had no appetite and unwilling to get out of bed. Also reports pt that pt has been very confused. Daughter in law is concerned that pt seems to be deteriorating very quickly. Hospital found no acute problem, kidney function had improved. Pt has follow up with GI for diverticulitis follow up, was advised that repeat colonoscopy was needed, this is scheduled. Pt has neuro appt at the end of December. Subjective: (As above and below)     Chief Complaint   Patient presents with   [facility] Follow Up     he is a 78y.o. year old male who presents for evaluation. Reviewed PmHx, RxHx, FmHx, SocHx, AllgHx and updated in chart. Review of Systems - negative except as listed above    Objective:     Vitals:    11/07/17 1032   BP: 101/70   Pulse: 100   Resp: 18   Temp: 97.7 °F (36.5 °C)   TempSrc: Oral   SpO2: 95%   Weight: 200 lb (90.7 kg)   Height: 5' 10\" (1.778 m)     Physical Examination: General appearance - alert, well appearing, and in no distress  Mental status - normal mood, behavior, speech, dress, motor activity, and thought processes  Mouth - mucous membranes moist, pharynx normal without lesions  Chest - clear to auscultation, no wheezes, rales or rhonchi, symmetric air entry  Heart - normal rate, regular rhythm, normal S1, S2, no murmurs, rubs, clicks or gallops  Musculoskeletal - shuffling gait, tremor in right hand    Assessment/ Plan:   1. Parkinsonian features  -called for earlier neuro appt, daughter will take him  - keep GI follow up  -continue to hold BP medications     Follow-up Disposition: As needed  I have discussed the diagnosis with the patient and the intended plan as seen in the above orders. The patient has received an after-visit summary and questions were answered concerning future plans.      Medication Side Effects and Warnings were discussed with patient: yes  Patient Labs were reviewed: yes  Patient Past Records were reviewed:  yes    Rosemary Robles M.D.

## 2017-11-08 NOTE — LETTER
11/8/2017 11:50 AM 
 
Patient:  Silvia Pena YOB: 1950 Date of Visit: 11/8/2017 Dear Traci Lyon MD 
[address] [facility] [address] [place] [address] VIA In Basket 
 : Thank you for referring Mr. Chago Servin to me for evaluation/treatment. Below are the relevant portions of my assessment and plan of care. If you have questions, please do not hesitate to call me. I look forward to following Mr. Zeeshan York along with you. Sincerely, Sanjuanita Larry MD

## 2017-11-08 NOTE — PATIENT INSTRUCTIONS
[facility] Neurology Clinic   Statement to Patients  April 1, 2014      In an effort to ensure the large volume of patient prescription refills is processed in the most efficient and expeditious manner, we are asking our patients to assist us by calling your Pharmacy for all prescription refills, this will include also your  Mail Order Pharmacy. The pharmacy will contact our office electronically to continue the refill process. Please do not wait until the last minute to call your pharmacy. We need at least 48 hours (2days) to fill prescriptions. We also encourage you to call your pharmacy before going to  your prescription to make sure it is ready. With regard to controlled substance prescription refill requests (narcotic refills) that need to be picked up at our office, we ask your cooperation by providing us with at least 72 hours (3days) notice that you will need a refill. We will not refill narcotic prescription refill requests after 4:00pm on any weekday, Monday through Thursday, or after 2:00pm on Fridays, or on the weekends. We encourage everyone to explore another way of getting your prescription refill request processed using [employer], our patient web portal through our electronic medical record system. [employer] is an efficient and effective way to communicate your medication request directly to the office and  downloadable as an lewis on your smart phone . [employer] also features a review functionality that allows you to view your medication list as well as leave messages for your physician. Are you ready to get connected? If so please review the attatched instructions or speak to any of our staff to get you set up right away! Thank you so much for your cooperation. Should you have any questions please contact our Practice Administrator.     The Physicians and Staff,  [address] [place] [address] Neurology Clinic        Parkinson's Disease: Care Instructions  Your Care Instructions  Parkinson's disease can cause tremors, stiffness, and problems with movement. Severe or advanced cases can also cause problems with thinking. In Parkinson's disease, part of the brain cannot make enough dopamine, a chemical that helps control movement. Taking your medicines correctly and getting regular exercise may help you maintain your quality of life. There are many things that can cause Parkinson's disease symptoms, including some medicine, some toxins, and trauma to the head. The cause in most cases is not known. Follow-up care is a key part of your treatment and safety. Be sure to make and go to all appointments, and call your doctor if you are having problems. It's also a good idea to know your test results and keep a list of the medicines you take. How can you care for yourself at home? General care  · Take your medicines exactly as prescribed. Call your doctor if you think you are having a problem with your medicine. · Make sure your home is safe:  ¨ Place furniture so that you have something to hold on to as you walk around the house. ¨ Use chairs that make it easier to sit down and stand up. ¨ Group the things you use most, such as reading glasses, keys, and the telephone, in one easy-to-reach place. ¨ Tack down rugs so that you do not trip. ¨ Put no-slip tape and handrails in the tub to prevent falls. · Use a cane, walker, or scooter if your doctor suggests it. · Keep up your normal activities as much as you can. · Find ways to manage stress, which can make symptoms worse. · Spend time with family and friends. Join a support group for people with Parkinson's disease if you want extra help. · Depression is common with this condition. Tell your doctor if you have trouble sleeping, are eating too much or are not hungry, or feel sad or tearful all the time. Depression can be treated with medicine and counseling. Diet and exercise  · Eat a balanced diet.   · If you are taking levodopa, do not eat protein at the same time you take your medicine. Levodopa may not work as well if you take it at the same time you eat protein. You can eat normal amounts of protein. Talk to your doctor if you have questions. · If you have problems swallowing, change how and what you eat:  ¨ Try thick drinks, such as milk shakes. They are easier to swallow than other fluids. ¨ Do not eat foods that crumble easily. These can cause choking. ¨ Use a  to prepare food. Soft foods need less chewing. ¨ Eat small meals often so that you do not get tired from eating heavy meals. · Drink plenty of water and eat a high-fiber diet to prevent constipation. Parkinson's-and the medicines that treat it-may slow your intestines. · Get exercise on most days. Work with your doctor to set up a program of walking, swimming, or other exercise you are able to do. When should you call for help? Call your doctor now or seek immediate medical care if:  ? · You have a change in your symptoms. ? · You develop other problems from your condition, such as:  ¨ Injury from a fall. ¨ Thinking or memory problems. ¨ A urinary tract infection (burning pain when urinating). ? Watch closely for changes in your health, and be sure to contact your doctor if:  ? · You lose weight because of problems with eating. ? · You want more information about your condition or your medicines. Where can you learn more? Go to http://marli-jeremy.info/. Enter O796 in the search box to learn more about \"Parkinson's Disease: Care Instructions. \"  Current as of: October 14, 2016  Content Version: 11.4  © 2404-6851 [employer]. Care instructions adapted under license by [employer] (which disclaims liability or warranty for this information).  If you have questions about a medical condition or this instruction, always ask your healthcare professional. [employer] disclaims any warranty or liability for your use of this information.

## 2017-11-08 NOTE — MR AVS SNAPSHOT
Visit Information Date & Time Provider Department Dept. Phone Encounter #  
 11/8/2017 11:00 AM Sanjuanita Larry, [address] Neurology Clinic [phone] [id number] Follow-up Instructions Return in about 1 month (around 12/8/2017). Upcoming Health Maintenance Date Due Hepatitis C Screening 1950 DTaP/Tdap/Td series (1 - Tdap) 3/5/1972 FOBT Q 1 YEAR AGE 50-75 3/5/2001 ZOSTER VACCINE AGE 60> 1/5/2011 GLAUCOMA SCREENING Q2Y 3/5/2016 MEDICARE YEARLY EXAM 3/5/2016 Pneumococcal 65+ Low/Medium Risk (2 of 2 - PCV13) 10/10/2017 Allergies as of 11/8/2017  Review Complete On: 11/8/2017 By: Sanjuanita Larry MD  
 No Known Allergies Current Immunizations  Never Reviewed Name Date Influenza High Dose Vaccine PF 10/10/2017  3:22 PM  
 Pneumococcal Polysaccharide (PPSV-23) 10/10/2016 Not reviewed this visit You Were Diagnosed With   
  
 Codes Comments [facility]'s disease ([facility]    -  Primary ICD-10-CM: G20 
ICD-9-CM: 332.0 Vitals BP Pulse SpO2 Smoking Status 122/66 94 97% Never Smoker Preferred Pharmacy Pharmacy Name Phone [facility] [place] [phone] Your Updated Medication List  
  
   
This list is accurate as of: 11/8/17 11:42 AM.  Always use your most recent med list.  
  
  
  
  
 carbidopa-levodopa  mg per tablet Commonly known as:  SINEMET Take 1 Tab by mouth three (3) times daily. Indications: Parkinsonism FISH -1,400 mg Cpdr  
Generic drug:  omega 3-dha-epa-fish oil Take  by mouth. PROTONIX 40 mg tablet Generic drug:  pantoprazole Take 40 mg by mouth daily. Prescriptions Sent to Pharmacy Refills  
 carbidopa-levodopa (SINEMET)  mg per tablet 5 Sig: Take 1 Tab by mouth three (3) times daily. Indications: Parkinsonism  Class: Normal  
 Pharmacy: [address]  #: [phone] Route: Oral  
  
We Performed the Following REFERRAL TO PHYSICAL THERAPY [NPW10 Custom] Comments:  
 Please evaluate and treat patient for gait and balance training. Dx Parkinsons disease Follow-up Instructions Return in about 1 month (around 12/8/2017). Referral Information Referral ID Referred By Referred To  
  
 [id number] SEBASTIAN Tomas Not Available Visits Status Start Date End Date 1 New Request 11/8/17 11/8/18 If your referral has a status of pending review or denied, additional information will be sent to support the outcome of this decision. Patient Instructions PRESCRIPTION REFILL POLICY [facility] Neurology Clinic Statement to Patients April 1, 2014 In an effort to ensure the large volume of patient prescription refills is processed in the most efficient and expeditious manner, we are asking our patients to assist us by calling your Pharmacy for all prescription refills, this will include also your  Mail Order Pharmacy. The pharmacy will contact our office electronically to continue the refill process. Please do not wait until the last minute to call your pharmacy. We need at least 48 hours (2days) to fill prescriptions. We also encourage you to call your pharmacy before going to  your prescription to make sure it is ready. With regard to controlled substance prescription refill requests (narcotic refills) that need to be picked up at our office, we ask your cooperation by providing us with at least 72 hours (3days) notice that you will need a refill. We will not refill narcotic prescription refill requests after 4:00pm on any weekday, Monday through Thursday, or after 2:00pm on Fridays, or on the weekends.   
  
We encourage everyone to explore another way of getting your prescription refill request processed using [employer], our patient web portal through our electronic medical record system. [employer] is an efficient and effective way to communicate your medication request directly to the office and  downloadable as an lewis on your smart phone . [employer] also features a review functionality that allows you to view your medication list as well as leave messages for your physician. Are you ready to get connected? If so please review the attatched instructions or speak to any of our staff to get you set up right away! Thank you so much for your cooperation. Should you have any questions please contact our Practice Administrator. The Physicians and Staff,  [facility] Neurology Clinic Parkinson's Disease: Care Instructions Your Care Instructions Parkinson's disease can cause tremors, stiffness, and problems with movement. Severe or advanced cases can also cause problems with thinking. In Parkinson's disease, part of the brain cannot make enough dopamine, a chemical that helps control movement. Taking your medicines correctly and getting regular exercise may help you maintain your quality of life. There are many things that can cause Parkinson's disease symptoms, including some medicine, some toxins, and trauma to the head. The cause in most cases is not known. Follow-up care is a key part of your treatment and safety. Be sure to make and go to all appointments, and call your doctor if you are having problems. It's also a good idea to know your test results and keep a list of the medicines you take. How can you care for yourself at home? General care · Take your medicines exactly as prescribed. Call your doctor if you think you are having a problem with your medicine. · Make sure your home is safe: 
¨ Place furniture so that you have something to hold on to as you walk around the house. ¨ Use chairs that make it easier to sit down and stand up. ¨ Group the things you use most, such as reading glasses, keys, and the telephone, in one easy-to-reach place. ¨ Tack down rugs so that you do not trip. ¨ Put no-slip tape and handrails in the tub to prevent falls. · Use a cane, walker, or scooter if your doctor suggests it. · Keep up your normal activities as much as you can. · Find ways to manage stress, which can make symptoms worse. · Spend time with family and friends. Join a support group for people with Parkinson's disease if you want extra help. · Depression is common with this condition. Tell your doctor if you have trouble sleeping, are eating too much or are not hungry, or feel sad or tearful all the time. Depression can be treated with medicine and counseling. Diet and exercise · Eat a balanced diet. · If you are taking levodopa, do not eat protein at the same time you take your medicine. Levodopa may not work as well if you take it at the same time you eat protein. You can eat normal amounts of protein. Talk to your doctor if you have questions. · If you have problems swallowing, change how and what you eat: ¨ Try thick drinks, such as milk shakes. They are easier to swallow than other fluids. ¨ Do not eat foods that crumble easily. These can cause choking. ¨ Use a  to prepare food. Soft foods need less chewing. ¨ Eat small meals often so that you do not get tired from eating heavy meals. · Drink plenty of water and eat a high-fiber diet to prevent constipation. Parkinson's-and the medicines that treat it-may slow your intestines. · Get exercise on most days. Work with your doctor to set up a program of walking, swimming, or other exercise you are able to do. When should you call for help? Call your doctor now or seek immediate medical care if: 
? · You have a change in your symptoms. ? · You develop other problems from your condition, such as: ¨ Injury from a fall. ¨ Thinking or memory problems. ¨ A urinary tract infection (burning pain when urinating). ? Watch closely for changes in your health, and be sure to contact your doctor if: 
? · You lose weight because of problems with eating. ? · You want more information about your condition or your medicines. Where can you learn more? Go to http://marli-jeremy.info/. Enter Q486 in the search box to learn more about \"Parkinson's Disease: Care Instructions. \" Current as of: October 14, 2016 Content Version: 11.4 © 2308-3838 [employer]t. Care instructions adapted under license by PEVESA (which disclaims liability or warranty for this information). If you have questions about a medical condition or this instruction, always ask your healthcare professional. [address] any warranty or liability for your use of this information. Introducing [facility] & HEALTH SERVICES! Dear Manual Kidney: 
Thank you for requesting a Playsino account. Our records indicate that you already have an active Playsino account. You can access your account anytime at [email or web address] Did you know that you can access your hospital and ER discharge instructions at any time in Playsino? You can also review all of your test results from your hospital stay or ER visit. Additional Information If you have questions, please visit the Frequently Asked Questions section of the Playsino website at [email or web address] Remember, Playsino is NOT to be used for urgent needs. For medical emergencies, dial 911. Now available from your iPhone and Android! Please provide this summary of care documentation to your next provider. Your primary care clinician is listed as Kamlesh Sosa. If you have any questions after today's visit, please call [phone].

## 2017-11-08 NOTE — PROGRESS NOTES
NEUROLOGY NEW PATIENT OFFICE CONSULTATION      11/8/2017    RE: Ricardo Flores         1950      REFERRED BY:  Nikita Sosa MD        CHIEF COMPLAINT:  This is Ricardo Flores is a 78 y.o. male right handed retiree -  who had no chief complaint listed for this encounter. HPI:     For the past 1 yr, patient noted progressive bilateral hand resting tremor, started in both hands, worse on the R side.    (+) gait disturbance for the past 6 months had to use a cane, shuffling and hesitates. (+) clumsiness of hands    Was recently seen at [facility] for low BP and BP meds was stopped. (-) passed out  (+) lightheaded  (-) constipation  (-) erectile dysfunction  (+) lack of sweat  (-) dryness of eyes or mouth  Some forgetfullness  (-) hallucinations  (-) RBD  (-) stridor    Review of Systems   Constitutional: Negative for chills and fever. Skin: Negative for rash. All other systems reviewed and are negative    Past Medical Hx  Past Medical History:   Diagnosis Date    Diverticulitis 2017    Hypertension     Thromboembolus ([address]     15 years ago       Social Hx  Social History     Social History    Marital status:      Spouse name: N/A    Number of children: N/A    Years of education: N/A     Social History Main Topics    Smoking status: Never Smoker    Smokeless tobacco: Never Used    Alcohol use Yes      Comment: occasional    Drug use: No    Sexual activity: Yes     Partners: Female     Other Topics Concern    Not on file     Social History Narrative   Moved from [place] to stay with son    Family Hx  Family History   Problem Relation Age of Onset    Cancer Mother      bone    Diabetes Father     Heart Disease Father        ALLERGIES  No Known Allergies    CURRENT MEDS  Current Outpatient Prescriptions   Medication Sig Dispense Refill    pantoprazole (PROTONIX) 40 mg tablet Take 40 mg by mouth daily.              PREVIOUS WORKUP: (reviewed)  IMAGING:    CT Results (recent): I personally reviewed the images with the patient      Results from [place] encounter on 11/04/17   CT HEAD WO CONT   Narrative EXAM:  CT HEAD WO CONT    INDICATION:   AMS, lethargy, urinary incontinence, shuffle gait, ? NPH    COMPARISON: None. CONTRAST:  None. TECHNIQUE: Unenhanced CT of the head was performed using 5 mm images. Brain and  bone windows were generated. CT dose reduction was achieved through use of a  standardized protocol tailored for this examination and automatic exposure  control for dose modulation. FINDINGS:  The ventricles and sulci are normal in size, shape and configuration and  midline. There is no significant white matter disease. There is no intracranial  hemorrhage, extra-axial collection, mass, mass effect or midline shift. The  basilar cisterns are open. No acute infarct is identified. The bone windows  demonstrate no abnormalities. The visualized portions of the paranasal sinuses  and mastoid air cells are clear. Impression IMPRESSION: Normal CT of the head. MRI Results (recent):  No results found for this or any previous visit. IR Results (recent):  No results found for this or any previous visit. VAS/US Results (recent):  No results found for this or any previous visit. LABS (reviewed)  Results for orders placed or performed during the hospital encounter of 11/04/17   CBC WITH AUTOMATED DIFF   Result Value Ref Range    WBC 6.3 4.1 - 11.1 K/uL    RBC 4.42 4.10 - 5.70 M/uL    HGB 12.2 12.1 - 17.0 g/dL    HCT 36.8 36.6 - 50.3 %    MCV 83.3 80.0 - 99.0 FL    MCH 27.6 26.0 - 34.0 PG    MCHC 33.2 30.0 - 36.5 g/dL    RDW 14.1 11.5 - 14.5 %    PLATELET 330 852 - 080 K/uL    NEUTROPHILS 57 32 - 75 %    LYMPHOCYTES 18 12 - 49 %    MONOCYTES 18 (H) 5 - 13 %    EOSINOPHILS 5 0 - 7 %    BASOPHILS 2 (H) 0 - 1 %    ABS. NEUTROPHILS 3.6 1.8 - 8.0 K/UL    ABS. LYMPHOCYTES 1.1 0.8 - 3.5 K/UL    ABS.  MONOCYTES 1.1 (H) 0.0 - 1.0 K/UL    ABS. EOSINOPHILS 0.3 0.0 - 0.4 K/UL    ABS. BASOPHILS 0.1 0.0 - 0.1 K/UL   METABOLIC PANEL, COMPREHENSIVE   Result Value Ref Range    Sodium 130 (L) 136 - 145 mmol/L    Potassium 4.3 3.5 - 5.1 mmol/L    Chloride 97 97 - 108 mmol/L    CO2 25 21 - 32 mmol/L    Anion gap 8 5 - 15 mmol/L    Glucose 100 65 - 100 mg/dL    BUN 42 (H) 6 - 20 MG/DL    Creatinine 1.85 (H) 0.70 - 1.30 MG/DL    BUN/Creatinine ratio 23 (H) 12 - 20      GFR est AA 45 (L) >60 ml/min/1.73m2    GFR est non-AA 37 (L) >60 ml/min/1.73m2    Calcium 9.1 8.5 - 10.1 MG/DL    Bilirubin, total 0.8 0.2 - 1.0 MG/DL    ALT (SGPT) 23 12 - 78 U/L    AST (SGOT) 35 15 - 37 U/L    Alk.  phosphatase 60 45 - 117 U/L    Protein, total 7.5 6.4 - 8.2 g/dL    Albumin 3.3 (L) 3.5 - 5.0 g/dL    Globulin 4.2 (H) 2.0 - 4.0 g/dL    A-G Ratio 0.8 (L) 1.1 - 2.2     URINALYSIS W/ REFLEX CULTURE   Result Value Ref Range    Color YELLOW/STRAW      Appearance CLEAR CLEAR      Specific gravity 1.016 1.003 - 1.030      pH (UA) 5.0 5.0 - 8.0      Protein NEGATIVE  NEG mg/dL    Glucose NEGATIVE  NEG mg/dL    Ketone NEGATIVE  NEG mg/dL    Bilirubin NEGATIVE  NEG      Blood NEGATIVE  NEG      Urobilinogen 0.2 0.2 - 1.0 EU/dL    Nitrites NEGATIVE  NEG      Leukocyte Esterase NEGATIVE  NEG      WBC 0-4 0 - 4 /hpf    RBC 0-5 0 - 5 /hpf    Epithelial cells FEW FEW /lpf    Bacteria NEGATIVE  NEG /hpf    UA:UC IF INDICATED CULTURE NOT INDICATED BY UA RESULT CNI      Hyaline cast 0-2 0 - 5 /lpf   TROPONIN I   Result Value Ref Range    Troponin-I, Qt. <0.04 <0.05 ng/mL   MAGNESIUM   Result Value Ref Range    Magnesium 1.4 (L) 1.6 - 2.4 mg/dL   EKG, 12 LEAD, INITIAL   Result Value Ref Range    Ventricular Rate 100 BPM    Atrial Rate 100 BPM    P-R Interval 148 ms    QRS Duration 80 ms    Q-T Interval 330 ms    QTC Calculation (Bezet) 425 ms    Calculated P Axis 55 degrees    Calculated R Axis 81 degrees    Calculated T Axis 34 degrees    Diagnosis       Normal sinus rhythm  No previous ECGs available  Confirmed by Priya Billings M.D., Aaron Randall ([id number]) on 11/5/2017 3:05:16 PM         Physical Exam:     Visit Vitals    /66    Pulse 94    SpO2 97%     General:  Alert, cooperative, no distress. Head:  Normocephalic, without obvious abnormality, atraumatic. Eyes:  Conjunctivae/corneas clear. Lungs:  Heart:   Non labored breathing  Regular rate and rhythm, no carotid bruits   Abdomen:   Soft, non-distended   Extremities: Extremities normal, atraumatic, no cyanosis or edema. Pulses: 2+ and symmetric all extremities. Skin: Skin color, texture, turgor normal. No rashes or lesions. Neurologic Exam     Gen: Attention normal             Language: naming, repetition, fluency normal             Memory: intact recent and remote memory  Cranial Nerves:  I: smell Not tested   II: visual fields Full to confrontation   II: pupils Equal, round, reactive to light   II: optic disc No papilledema   III,VII: ptosis none   III,IV,VI: extraocular muscles  Full ROM   V: mastication normal   V: facial light touch sensation  normal   VII: facial muscle function   symmetric   VIII: hearing symmetric   IX: soft palate elevation  normal   XI: trapezius strength  5/5   XI: sternocleidomastoid strength 5/5   XI: neck flexion strength  5/5   XII: tongue  midline     Motor: (+) masked facies  (+) bilateral hand pill rolling resting tremor  (+) bilateral hand rigidity and bradykinesia  Sensory: intact to LT, PP, vibration, and JPS  Reflexes: 2+ UE and knees, absent ankles throughout; Down going toes  Coordination: Good FTN and HTS  Gait: slow, able to stand by himself, 3 steps pivot           Impression:     Anastacio Perez is a 78 y.o. male who  has a past medical history of Diverticulitis (2017); Hypertension; and Thromboembolus ([facility]  who for the past 1 yr, noted progressive bilateral hand resting tremor, started in both hands, worse on the R side with gait disturbance for the past 6 months had to use a cane, shuffling and hesitates, clumsiness of hands concerning for Parkinsons disease. Patient also has some history of lightheadedness, lack of sweat and low BP concerning for autonomic dysfunction associated with Parkinson. RECOMMENDATIONS  1. Trial of Sinemet 25/100 TID  2. Physical therapy referral  3. Given information about Parkinson foundation  4. I had a long discussion with patient, wife and son regarding pathophysiology, prognosis and available treatment for this condition      Mr. Tod Bolden has a reminder for a \"due or due soon\" health maintenance. I have asked that he contact his primary care provider for follow-up on this health maintenance. Follow-up Disposition: Not on File        Thank you for the consultation      Paula Ceron MD  Diplomate, American Board of Psychiatry and Neurology  Diplomate, Neuromuscular Medicine  Diplomate, American Board of Electrodiagnostic Medicine    Greater than 50% of time spent counseling patient      CC:  Morrison Cogan, MD  Fax: [phone]

## 2017-12-13 PROBLEM — G20 PARKINSON DISEASE (HCC): Status: ACTIVE | Noted: 2017-01-01

## 2017-12-13 NOTE — PROGRESS NOTES
Neurology Progress Note    Patient ID:  Yoel Gandhi  [id number]  44 y.o.  1950      Subjective:   History:  Yoel Gandih is a 78 y.o. male who  has a past medical history of Diverticulitis (2017); Hypertension; and Thromboembolus ([address] who for the past 1 yr, noted progressive bilateral hand resting tremor, started in both hands, worse on the R side with gait disturbance for the past 6 months had to use a cane, shuffling and hesitates, clumsiness of hands concerning for Parkinsons disease. Patient also has some history of lightheadedness, lack of sweat and low BP concerning for autonomic dysfunction associated with Parkinson.     Since the last time, patient was started on Sinemet 25/100 TID with significant improvement of symptoms with no side-effects. Has not started physical therapy. Wakes up every hour at night. ROS:  Per HPI-  Otherwise the remainder of ROS was negative    Social Hx  Social History     Social History    Marital status:      Spouse name: N/A    Number of children: N/A    Years of education: N/A     Social History Main Topics    Smoking status: Never Smoker    Smokeless tobacco: Never Used    Alcohol use Yes      Comment: occasional    Drug use: No    Sexual activity: Yes     Partners: Female     Other Topics Concern    None     Social History Narrative       Meds:  Current Outpatient Prescriptions on File Prior to Visit   Medication Sig Dispense Refill    omega 3-dha-epa-fish oil (FISH OIL) 900-1,400 mg cpDR Take  by mouth.  pantoprazole (PROTONIX) 40 mg tablet Take 40 mg by mouth daily.  carbidopa-levodopa (SINEMET)  mg per tablet Take 1 Tab by mouth three (3) times daily. Indications: Parkinsonism 90 Tab 5     No current facility-administered medications on file prior to visit.         Imaging:    CT Results (recent):    Results from Hospital Encounter encounter on 11/04/17   CT HEAD WO CONT   Narrative EXAM:  CT HEAD WO CONT    INDICATION: AMS, lethargy, urinary incontinence, shuffle gait, ? NPH    COMPARISON: None. CONTRAST:  None. TECHNIQUE: Unenhanced CT of the head was performed using 5 mm images. Brain and  bone windows were generated. CT dose reduction was achieved through use of a  standardized protocol tailored for this examination and automatic exposure  control for dose modulation. FINDINGS:  The ventricles and sulci are normal in size, shape and configuration and  midline. There is no significant white matter disease. There is no intracranial  hemorrhage, extra-axial collection, mass, mass effect or midline shift. The  basilar cisterns are open. No acute infarct is identified. The bone windows  demonstrate no abnormalities. The visualized portions of the paranasal sinuses  and mastoid air cells are clear. Impression IMPRESSION: Normal CT of the head. MRI Results (recent):  No results found for this or any previous visit. IR Results (recent):  No results found for this or any previous visit. VAS/US Results (recent):  No results found for this or any previous visit. Reviewed records in [facility] and [facility] tab today    Lab Review     Admission on 11/04/2017, Discharged on 11/04/2017   Component Date Value Ref Range Status    WBC 11/04/2017 6.3  4.1 - 11.1 K/uL Final    RBC 11/04/2017 4.42  4.10 - 5.70 M/uL Final    HGB 11/04/2017 12.2  12.1 - 17.0 g/dL Final    HCT 11/04/2017 36.8  36.6 - 50.3 % Final    MCV 11/04/2017 83.3  80.0 - 99.0 FL Final    MCH 11/04/2017 27.6  26.0 - 34.0 PG Final    MCHC 11/04/2017 33.2  30.0 - 36.5 g/dL Final    RDW 11/04/2017 14.1  11.5 - 14.5 % Final    PLATELET 44/96/3147 566  150 - 400 K/uL Final    NEUTROPHILS 11/04/2017 57  32 - 75 % Final    LYMPHOCYTES 11/04/2017 18  12 - 49 % Final    MONOCYTES 11/04/2017 18* 5 - 13 % Final    EOSINOPHILS 11/04/2017 5  0 - 7 % Final    BASOPHILS 11/04/2017 2* 0 - 1 % Final    ABS.  NEUTROPHILS 11/04/2017 3.6  1.8 - 8.0 K/UL Final    ABS. LYMPHOCYTES 11/04/2017 1.1  0.8 - 3.5 K/UL Final    ABS. MONOCYTES 11/04/2017 1.1* 0.0 - 1.0 K/UL Final    ABS. EOSINOPHILS 11/04/2017 0.3  0.0 - 0.4 K/UL Final    ABS. BASOPHILS 11/04/2017 0.1  0.0 - 0.1 K/UL Final    Sodium 11/04/2017 130* 136 - 145 mmol/L Final    Potassium 11/04/2017 4.3  3.5 - 5.1 mmol/L Final    Chloride 11/04/2017 97  97 - 108 mmol/L Final    CO2 11/04/2017 25  21 - 32 mmol/L Final    Anion gap 11/04/2017 8  5 - 15 mmol/L Final    Glucose 11/04/2017 100  65 - 100 mg/dL Final    BUN 11/04/2017 42* 6 - 20 MG/DL Final    Creatinine 11/04/2017 1.85* 0.70 - 1.30 MG/DL Final    BUN/Creatinine ratio 11/04/2017 23* 12 - 20   Final    GFR est AA 11/04/2017 45* >60 ml/min/1.73m2 Final    GFR est non-AA 11/04/2017 37* >60 ml/min/1.73m2 Final    Comment: Estimated GFR is calculated using the IDMS-traceable Modification of Diet in Renal Disease (MDRD) Study equation, reported for both  Americans (GFRAA) and non- Americans (GFRNA), and normalized to 1.73m2 body surface area. The physician must decide which value applies to the patient. The MDRD study equation should only be used in individuals age 25 or older. It has not been validated for the following: pregnant women, patients with serious comorbid conditions, or on certain medications, or persons with extremes of body size, muscle mass, or nutritional status.  Calcium 11/04/2017 9.1  8.5 - 10.1 MG/DL Final    Bilirubin, total 11/04/2017 0.8  0.2 - 1.0 MG/DL Final    ALT (SGPT) 11/04/2017 23  12 - 78 U/L Final    AST (SGOT) 11/04/2017 35  15 - 37 U/L Final    Alk.  phosphatase 11/04/2017 60  45 - 117 U/L Final    Protein, total 11/04/2017 7.5  6.4 - 8.2 g/dL Final    Albumin 11/04/2017 3.3* 3.5 - 5.0 g/dL Final    Globulin 11/04/2017 4.2* 2.0 - 4.0 g/dL Final    A-G Ratio 11/04/2017 0.8* 1.1 - 2.2   Final    Color 11/04/2017 YELLOW/STRAW    Final    Color Reference Range: Straw, Yellow or Dark Yellow    Appearance 11/04/2017 CLEAR  CLEAR   Final    Specific gravity 11/04/2017 1.016  1.003 - 1.030   Final    pH (UA) 11/04/2017 5.0  5.0 - 8.0   Final    Protein 11/04/2017 NEGATIVE   NEG mg/dL Final    Glucose 11/04/2017 NEGATIVE   NEG mg/dL Final    Ketone 11/04/2017 NEGATIVE   NEG mg/dL Final    Bilirubin 11/04/2017 NEGATIVE   NEG   Final    Blood 11/04/2017 NEGATIVE   NEG   Final    Urobilinogen 11/04/2017 0.2  0.2 - 1.0 EU/dL Final    Nitrites 11/04/2017 NEGATIVE   NEG   Final    Leukocyte Esterase 11/04/2017 NEGATIVE   NEG   Final    WBC 11/04/2017 0-4  0 - 4 /hpf Final    RBC 11/04/2017 0-5  0 - 5 /hpf Final    Epithelial cells 11/04/2017 FEW  FEW /lpf Final    Epithelial cell category consists of squamous cells and /or transitional urothelial cells. Renal tubular cells, if present, are separately identified as such.  Bacteria 11/04/2017 NEGATIVE   NEG /hpf Final    UA:UC IF INDICATED 11/04/2017 CULTURE NOT INDICATED BY UA RESULT  CNI   Final    Hyaline cast 11/04/2017 0-2  0 - 5 /lpf Final    Ventricular Rate 11/04/2017 100  BPM Final    Atrial Rate 11/04/2017 100  BPM Final    P-R Interval 11/04/2017 148  ms Final    QRS Duration 11/04/2017 80  ms Final    Q-T Interval 11/04/2017 330  ms Final    QTC Calculation (Bezet) 11/04/2017 425  ms Final    Calculated P Axis 11/04/2017 55  degrees Final    Calculated R Axis 11/04/2017 81  degrees Final    Calculated T Axis 11/04/2017 34  degrees Final    Diagnosis 11/04/2017    Final                    Value:Normal sinus rhythm  No previous ECGs available  Confirmed by Gina Oden M.D., Thao Scott ([id number]) on 11/5/2017 3:05:16 PM      Troponin-I, Qt. 11/04/2017 <0.04  <0.05 ng/mL Final    Comment: The presence of detectable troponin above the reference range indicates myocardial injury which may be due to ischemia, myocarditis, trauma, etc.  Clinical correlation is necessary to establish the significance of this finding.   Sequential testing is recommended to determine if the typical rise and fall of cTnI is demonstrated. Note:  Cardiac troponin I has a relatively long half life and may be present well after the CK MB has returned to baseline. The reference range is based on the 99th percentile of the referent population.  Magnesium 11/04/2017 1.4* 1.6 - 2.4 mg/dL Final   Office Visit on 11/01/2017   Component Date Value Ref Range Status    WBC 11/01/2017 5.7  3.4 - 10.8 x10E3/uL Final    RBC 11/01/2017 4.58  4.14 - 5.80 x10E6/uL Final    HGB 11/01/2017 12.7  12.6 - 17.7 g/dL Final    HCT 11/01/2017 39.2  37.5 - 51.0 % Final    MCV 11/01/2017 86  79 - 97 fL Final    MCH 11/01/2017 27.7  26.6 - 33.0 pg Final    MCHC 11/01/2017 32.4  31.5 - 35.7 g/dL Final    RDW 11/01/2017 13.9  12.3 - 15.4 % Final    PLATELET 59/64/6860 374  150 - 379 x10E3/uL Final    NEUTROPHILS 11/01/2017 62  Not Estab. % Final    Lymphocytes 11/01/2017 17  Not Estab. % Final    MONOCYTES 11/01/2017 15  Not Estab. % Final    EOSINOPHILS 11/01/2017 4  Not Estab. % Final    BASOPHILS 11/01/2017 1  Not Estab. % Final    ABS. NEUTROPHILS 11/01/2017 3.5  1.4 - 7.0 x10E3/uL Final    Abs Lymphocytes 11/01/2017 1.0  0.7 - 3.1 x10E3/uL Final    ABS. MONOCYTES 11/01/2017 0.8  0.1 - 0.9 x10E3/uL Final    ABS. EOSINOPHILS 11/01/2017 0.2  0.0 - 0.4 x10E3/uL Final    ABS. BASOPHILS 11/01/2017 0.1  0.0 - 0.2 x10E3/uL Final    IMMATURE GRANULOCYTES 11/01/2017 1  Not Estab. % Final    ABS. IMM. GRANS.  11/01/2017 0.1  0.0 - 0.1 x10E3/uL Final    Cholesterol, total 11/01/2017 103  100 - 199 mg/dL Final    Triglyceride 11/01/2017 269* 0 - 149 mg/dL Final    HDL Cholesterol 11/01/2017 11* >39 mg/dL Final    VLDL, calculated 11/01/2017 54* 5 - 40 mg/dL Final    LDL, calculated 11/01/2017 38  0 - 99 mg/dL Final    Glucose 11/01/2017 91  65 - 99 mg/dL Final    BUN 11/01/2017 37* 8 - 27 mg/dL Final    Creatinine 11/01/2017 2.31* 0.76 - 1.27 mg/dL Final    GFR est non-AA 11/01/2017 28* >59 mL/min/1.73 Final    GFR est AA 11/01/2017 33* >59 mL/min/1.73 Final    BUN/Creatinine ratio 11/01/2017 16  10 - 24 Final    Sodium 11/01/2017 137  134 - 144 mmol/L Final    Potassium 11/01/2017 4.6  3.5 - 5.2 mmol/L Final    Chloride 11/01/2017 97  96 - 106 mmol/L Final    CO2 11/01/2017 21  18 - 29 mmol/L Final    Calcium 11/01/2017 9.4  8.6 - 10.2 mg/dL Final    Protein, total 11/01/2017 7.2  6.0 - 8.5 g/dL Final    Albumin 11/01/2017 4.2  3.6 - 4.8 g/dL Final    GLOBULIN, TOTAL 11/01/2017 3.0  1.5 - 4.5 g/dL Final    A-G Ratio 11/01/2017 1.4  1.2 - 2.2 Final    Bilirubin, total 11/01/2017 1.2  0.0 - 1.2 mg/dL Final    Alk. phosphatase 11/01/2017 56  39 - 117 IU/L Final    AST (SGOT) 11/01/2017 21  0 - 40 IU/L Final    ALT (SGPT) 11/01/2017 15  0 - 44 IU/L Final    TSH 11/01/2017 0.883  0.450 - 4.500 uIU/mL Final    Hemoglobin A1c 11/01/2017 5.4  4.8 - 5.6 % Final    Comment:          Pre-diabetes: 5.7 - 6.4           Diabetes: >6.4           Glycemic control for adults with diabetes: <7.0      Estimated average glucose 11/01/2017 108  mg/dL Final    Amylase 11/01/2017 40  31 - 124 U/L Final    Lipase 11/01/2017 30  13 - 78 U/L Final    TIBC 11/01/2017 177* 250 - 450 ug/dL Final    UIBC 11/01/2017 136  111 - 343 ug/dL Final    Iron 11/01/2017 41  38 - 169 ug/dL Final    Iron % saturation 11/01/2017 23  15 - 55 % Final    INTERPRETATION 11/01/2017 Note   Final    Supplemental report is available.  PDF IMAGE 63/69/5036 Not applicable   Final    Interpretation 11/01/2017 Note   Final    Supplemental report is available. Objective:       Exam:  Visit Vitals    /72    Pulse 78    SpO2 98%     Gen: Awake, alert, follows commands  Appropriate appearance, normal speech. Oriented to all spheres. No visual field defect on confrontation exam.  Full eyes movement, with no nystagmus, no diplopia, no ptosis. Normal gag and swallow.   All remaining cranial nerves were normal  Motor: more expression in face with less rigidity, tremor and bradykinesia  Sensory: intact to LT, PP, vibration, and JPS  Reflexes: 2+ UE and knees, absent ankles throughout; Down going toes  Coordination: Good FTN and HTS  Gait: able to stand with 1 step pivot (previously had to do 3 steps)    Assessment:       ICD-10-CM ICD-9-CM    1. [facility] disease ([facility] G20 332.0      Sanjay and Yahr stage 3    Insomnia    Plan:   1. Continue Sinemet 25/100 TID  2. Encouraged to start Physical therapy   3. Melatonin for sleep issue      Follow-up Disposition:  Return in about 6 months (around 6/13/2018).           Maya Patterson MD  Diplomate, American Board of Psychiatry and Neurology  Diplomate, Neuromuscular Medicine  Diplomate, American Board of Electrodiagnostic Medicine

## 2017-12-13 NOTE — PATIENT INSTRUCTIONS
Parkinson's Disease: Care Instructions  Your Care Instructions  Parkinson's disease can cause tremors, stiffness, and problems with movement. Severe or advanced cases can also cause problems with thinking. In Parkinson's disease, part of the brain cannot make enough dopamine, a chemical that helps control movement. Taking your medicines correctly and getting regular exercise may help you maintain your quality of life. There are many things that can cause Parkinson's disease symptoms, including some medicine, some toxins, and trauma to the head. The cause in most cases is not known. Follow-up care is a key part of your treatment and safety. Be sure to make and go to all appointments, and call your doctor if you are having problems. It's also a good idea to know your test results and keep a list of the medicines you take. How can you care for yourself at home? General care  · Take your medicines exactly as prescribed. Call your doctor if you think you are having a problem with your medicine. · Make sure your home is safe:  ¨ Place furniture so that you have something to hold on to as you walk around the house. ¨ Use chairs that make it easier to sit down and stand up. ¨ Group the things you use most, such as reading glasses, keys, and the telephone, in one easy-to-reach place. ¨ Tack down rugs so that you do not trip. ¨ Put no-slip tape and handrails in the tub to prevent falls. · Use a cane, walker, or scooter if your doctor suggests it. · Keep up your normal activities as much as you can. · Find ways to manage stress, which can make symptoms worse. · Spend time with family and friends. Join a support group for people with Parkinson's disease if you want extra help. · Depression is common with this condition. Tell your doctor if you have trouble sleeping, are eating too much or are not hungry, or feel sad or tearful all the time. Depression can be treated with medicine and counseling.   Diet and exercise  · Eat a balanced diet. · If you are taking levodopa, do not eat protein at the same time you take your medicine. Levodopa may not work as well if you take it at the same time you eat protein. You can eat normal amounts of protein. Talk to your doctor if you have questions. · If you have problems swallowing, change how and what you eat:  ¨ Try thick drinks, such as milk shakes. They are easier to swallow than other fluids. ¨ Do not eat foods that crumble easily. These can cause choking. ¨ Use a  to prepare food. Soft foods need less chewing. ¨ Eat small meals often so that you do not get tired from eating heavy meals. · Drink plenty of water and eat a high-fiber diet to prevent constipation. Parkinson's-and the medicines that treat it-may slow your intestines. · Get exercise on most days. Work with your doctor to set up a program of walking, swimming, or other exercise you are able to do. When should you call for help? Call your doctor now or seek immediate medical care if:  ? · You have a change in your symptoms. ? · You develop other problems from your condition, such as:  ¨ Injury from a fall. ¨ Thinking or memory problems. ¨ A urinary tract infection (burning pain when urinating). ? Watch closely for changes in your health, and be sure to contact your doctor if:  ? · You lose weight because of problems with eating. ? · You want more information about your condition or your medicines. Where can you learn more? Go to http://marli-jeremy.info/. Enter L093 in the search box to learn more about \"Parkinson's Disease: Care Instructions. \"  Current as of: October 14, 2016  Content Version: 11.4  © 5711-6184 [employer]. Care instructions adapted under license by [employer] (which disclaims liability or warranty for this information).  If you have questions about a medical condition or this instruction, always ask your healthcare professional. [employer] disclaims any warranty or liability for your use of this information.

## 2017-12-13 NOTE — TELEPHONE ENCOUNTER
Patient came into office requesting refill of    Pantoprazole, 40 mg   (given by other physician)      [facility] [address] [facility]     Phone   patient

## 2017-12-13 NOTE — LETTER
12/13/2017 1:56 PM 
 
Patient:  Anastacio Perez YOB: 1950 Date of Visit: 12/13/2017 Dear Brittany Dominguez MD 
[address] [place] [address] VIA In Basket 
 : Thank you for referring Mr. Sherice Pritchett to me for evaluation/treatment. Below are the relevant portions of my assessment and plan of care. If you have questions, please do not hesitate to call me. I look forward to following Mr. Carolyne Read along with you. Sincerely, Jelani Salas MD

## 2017-12-13 NOTE — MR AVS SNAPSHOT
Visit Information Date & Time Provider Department Dept. Phone Encounter #  
 12/13/2017  1:40 PM Helena Peñaloza, [address] [facility] [phone] [id number] Follow-up Instructions Return in about 6 months (around 6/13/2018). Upcoming Health Maintenance Date Due Hepatitis C Screening 1950 DTaP/Tdap/Td series (1 - Tdap) 3/5/1972 FOBT Q 1 YEAR AGE 50-75 3/5/2001 ZOSTER VACCINE AGE 60> 1/5/2011 GLAUCOMA SCREENING Q2Y 3/5/2016 MEDICARE YEARLY EXAM 3/5/2016 Pneumococcal 65+ Low/Medium Risk (2 of 2 - PCV13) 10/10/2017 Allergies as of 12/13/2017  Review Complete On: 12/13/2017 By: Mariaelena Rosario LPN No Known Allergies Current Immunizations  Never Reviewed Name Date Influenza High Dose Vaccine PF 10/10/2017  3:22 PM  
 Pneumococcal Polysaccharide (PPSV-23) 10/10/2016 Not reviewed this visit You Were Diagnosed With   
  
 Codes Comments Parkinson disease ([facility]    -  Primary ICD-10-CM: G20 
ICD-9-CM: 332.0 Vitals BP Pulse SpO2 Smoking Status 124/72 78 98% Never Smoker Preferred Pharmacy Pharmacy Name Phone [facility] [place], [address] [phone] Your Updated Medication List  
  
   
This list is accurate as of: 12/13/17  1:50 PM.  Always use your most recent med list.  
  
  
  
  
 carbidopa-levodopa  mg per tablet Commonly known as:  SINEMET Take 1 Tab by mouth three (3) times daily. Indications: Parkinsonism FISH -1,400 mg Cpdr  
Generic drug:  omega 3-dha-epa-fish oil Take  by mouth. PROTONIX 40 mg tablet Generic drug:  pantoprazole Take 40 mg by mouth daily. Follow-up Instructions Return in about 6 months (around 6/13/2018). Patient Instructions Parkinson's Disease: Care Instructions Your Care Instructions Parkinson's disease can cause tremors, stiffness, and problems with movement. Severe or advanced cases can also cause problems with thinking. In Parkinson's disease, part of the brain cannot make enough dopamine, a chemical that helps control movement. Taking your medicines correctly and getting regular exercise may help you maintain your quality of life. There are many things that can cause Parkinson's disease symptoms, including some medicine, some toxins, and trauma to the head. The cause in most cases is not known. Follow-up care is a key part of your treatment and safety. Be sure to make and go to all appointments, and call your doctor if you are having problems. It's also a good idea to know your test results and keep a list of the medicines you take. How can you care for yourself at home? General care · Take your medicines exactly as prescribed. Call your doctor if you think you are having a problem with your medicine. · Make sure your home is safe: 
¨ Place furniture so that you have something to hold on to as you walk around the house. ¨ Use chairs that make it easier to sit down and stand up. ¨ Group the things you use most, such as reading glasses, keys, and the telephone, in one easy-to-reach place. ¨ Tack down rugs so that you do not trip. ¨ Put no-slip tape and handrails in the tub to prevent falls. · Use a cane, walker, or scooter if your doctor suggests it. · Keep up your normal activities as much as you can. · Find ways to manage stress, which can make symptoms worse. · Spend time with family and friends. Join a support group for people with Parkinson's disease if you want extra help. · Depression is common with this condition. Tell your doctor if you have trouble sleeping, are eating too much or are not hungry, or feel sad or tearful all the time. Depression can be treated with medicine and counseling. Diet and exercise · Eat a balanced diet. · If you are taking levodopa, do not eat protein at the same time you take your medicine. Levodopa may not work as well if you take it at the same time you eat protein. You can eat normal amounts of protein. Talk to your doctor if you have questions. · If you have problems swallowing, change how and what you eat: ¨ Try thick drinks, such as milk shakes. They are easier to swallow than other fluids. ¨ Do not eat foods that crumble easily. These can cause choking. ¨ Use a  to prepare food. Soft foods need less chewing. ¨ Eat small meals often so that you do not get tired from eating heavy meals. · Drink plenty of water and eat a high-fiber diet to prevent constipation. Parkinson's-and the medicines that treat it-may slow your intestines. · Get exercise on most days. Work with your doctor to set up a program of walking, swimming, or other exercise you are able to do. When should you call for help? Call your doctor now or seek immediate medical care if: 
? · You have a change in your symptoms. ? · You develop other problems from your condition, such as: ¨ Injury from a fall. ¨ Thinking or memory problems. ¨ A urinary tract infection (burning pain when urinating). ? Watch closely for changes in your health, and be sure to contact your doctor if: 
? · You lose weight because of problems with eating. ? · You want more information about your condition or your medicines. Where can you learn more? Go to http://marli-jeremy.info/. Enter C073 in the search box to learn more about \"Parkinson's Disease: Care Instructions. \" Current as of: October 14, 2016 Content Version: 11.4 © 0631-8129 [employer]. Care instructions adapted under license by [employer] (which disclaims liability or warranty for this information).  If you have questions about a medical condition or this instruction, always ask your healthcare professional. [employer] disclaims any warranty or liability for your use of this information. Introducing [facility] & HEALTH SERVICES! Dear Julio Lucas: 
Thank you for requesting a [email or web address] account. Our records indicate that you already have an active [email or web address] account. You can access your account anytime at [email or web address] Did you know that you can access your hospital and ER discharge instructions at any time in [email or web address]? You can also review all of your test results from your hospital stay or ER visit. Additional Information If you have questions, please visit the Frequently Asked Questions section of the [email or web address] website at [email or web address] Remember, [email or web address] is NOT to be used for urgent needs. For medical emergencies, dial 911. Now available from your iPhone and Android! Please provide this summary of care documentation to your next provider. Your primary care clinician is listed as Kamlesh Sosa. If you have any questions after today's visit, please call [phone].

## 2018-01-01 ENCOUNTER — PATIENT OUTREACH (OUTPATIENT)
Dept: FAMILY MEDICINE CLINIC | Age: 68
End: 2018-01-01

## 2018-01-01 ENCOUNTER — APPOINTMENT (OUTPATIENT)
Dept: CT IMAGING | Age: 68
DRG: 563 | End: 2018-01-01
Attending: PHYSICIAN ASSISTANT
Payer: MEDICARE

## 2018-01-01 ENCOUNTER — APPOINTMENT (OUTPATIENT)
Dept: GENERAL RADIOLOGY | Age: 68
DRG: 563 | End: 2018-01-01
Attending: PHYSICIAN ASSISTANT
Payer: MEDICARE

## 2018-01-01 ENCOUNTER — OFFICE VISIT (OUTPATIENT)
Dept: NEUROLOGY | Age: 68
End: 2018-01-01

## 2018-01-01 ENCOUNTER — HOSPITAL ENCOUNTER (INPATIENT)
Age: 68
LOS: 4 days | Discharge: NURSING, REHAB OR LONG-TERM CARE | DRG: 563 | End: 2018-04-11
Attending: EMERGENCY MEDICINE | Admitting: HOSPITALIST
Payer: MEDICARE

## 2018-01-01 VITALS
WEIGHT: 193 LBS | SYSTOLIC BLOOD PRESSURE: 126 MMHG | OXYGEN SATURATION: 93 % | BODY MASS INDEX: 27.69 KG/M2 | DIASTOLIC BLOOD PRESSURE: 68 MMHG | HEART RATE: 74 BPM

## 2018-01-01 VITALS
HEART RATE: 103 BPM | BODY MASS INDEX: 27.2 KG/M2 | WEIGHT: 190 LBS | SYSTOLIC BLOOD PRESSURE: 118 MMHG | RESPIRATION RATE: 20 BRPM | OXYGEN SATURATION: 95 % | HEIGHT: 70 IN | DIASTOLIC BLOOD PRESSURE: 70 MMHG

## 2018-01-01 VITALS
HEIGHT: 70 IN | DIASTOLIC BLOOD PRESSURE: 69 MMHG | OXYGEN SATURATION: 93 % | RESPIRATION RATE: 16 BRPM | HEART RATE: 104 BPM | BODY MASS INDEX: 27.2 KG/M2 | WEIGHT: 190 LBS | SYSTOLIC BLOOD PRESSURE: 119 MMHG | TEMPERATURE: 98.3 F

## 2018-01-01 DIAGNOSIS — R41.3 MEMORY LOSS: ICD-10-CM

## 2018-01-01 DIAGNOSIS — G20 PARKINSON DISEASE (HCC): Primary | ICD-10-CM

## 2018-01-01 DIAGNOSIS — D64.9 ANEMIA, UNSPECIFIED TYPE: ICD-10-CM

## 2018-01-01 DIAGNOSIS — E86.0 DEHYDRATION: ICD-10-CM
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
## 2018-01-01 DIAGNOSIS — S52.202A CLOSED FRACTURE OF SHAFT OF LEFT ULNA, UNSPECIFIED FRACTURE MORPHOLOGY, INITIAL ENCOUNTER: Primary | ICD-10-CM

## 2018-01-01 DIAGNOSIS — G20 PARKINSON DISEASE (HCC): ICD-10-CM

## 2018-01-01 DIAGNOSIS — E87.1 LOW SODIUM LEVELS: ICD-10-CM

## 2018-01-01 DIAGNOSIS — G20 PARKINSON'S DISEASE (HCC): Primary | ICD-10-CM
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
## 2018-01-01 LAB
ALBUMIN SERPL-MCNC: 2.5 G/DL (ref 3.5–5)
ALBUMIN/GLOB SERPL: 0.4 {RATIO} (ref 1.1–2.2)
ALP SERPL-CCNC: 57 U/L (ref 45–117)
ALT SERPL-CCNC: 16 U/L (ref 12–78)
ANION GAP SERPL CALC-SCNC: 9 MMOL/L (ref 5–15)
AST SERPL-CCNC: 31 U/L (ref 15–37)
ATRIAL RATE: 103 BPM
BASOPHILS # BLD: 0.1 K/UL (ref 0–0.1)
BASOPHILS NFR BLD: 1 % (ref 0–1)
BILIRUB SERPL-MCNC: 0.7 MG/DL (ref 0.2–1)
BUN SERPL-MCNC: 22 MG/DL (ref 6–20)
BUN/CREAT SERPL: 20 (ref 12–20)
CALCIUM SERPL-MCNC: 9.5 MG/DL (ref 8.5–10.1)
CALCULATED P AXIS, ECG09: 76 DEGREES
CALCULATED R AXIS, ECG10: -4 DEGREES
CALCULATED T AXIS, ECG11: 50 DEGREES
CHLORIDE SERPL-SCNC: 96 MMOL/L (ref 97–108)
CO2 SERPL-SCNC: 27 MMOL/L (ref 21–32)
CREAT SERPL-MCNC: 1.09 MG/DL (ref 0.7–1.3)
DIAGNOSIS, 93000: NORMAL
DIFFERENTIAL METHOD BLD: ABNORMAL
EOSINOPHIL # BLD: 0.1 K/UL (ref 0–0.4)
EOSINOPHIL NFR BLD: 2 % (ref 0–7)
ERYTHROCYTE [DISTWIDTH] IN BLOOD BY AUTOMATED COUNT: 14.3 % (ref 11.5–14.5)
GLOBULIN SER CALC-MCNC: 5.6 G/DL (ref 2–4)
GLUCOSE BLD STRIP.AUTO-MCNC: 78 MG/DL (ref 65–100)
GLUCOSE BLD STRIP.AUTO-MCNC: 85 MG/DL (ref 65–100)
GLUCOSE SERPL-MCNC: 94 MG/DL (ref 65–100)
HCT VFR BLD AUTO: 38.4 % (ref 36.6–50.3)
HGB BLD-MCNC: 11.7 G/DL (ref 12.1–17)
IMM GRANULOCYTES # BLD: 0.1 K/UL (ref 0–0.04)
IMM GRANULOCYTES NFR BLD AUTO: 1 % (ref 0–0.5)
LYMPHOCYTES # BLD: 0.8 K/UL (ref 0.8–3.5)
LYMPHOCYTES NFR BLD: 11 % (ref 12–49)
MCH RBC QN AUTO: 24.8 PG (ref 26–34)
MCHC RBC AUTO-ENTMCNC: 30.5 G/DL (ref 30–36.5)
MCV RBC AUTO: 81.4 FL (ref 80–99)
MONOCYTES # BLD: 0.9 K/UL (ref 0–1)
MONOCYTES NFR BLD: 13 % (ref 5–13)
NEUTS SEG # BLD: 5.2 K/UL (ref 1.8–8)
NEUTS SEG NFR BLD: 72 % (ref 32–75)
NRBC # BLD: 0 K/UL (ref 0–0.01)
NRBC BLD-RTO: 0 PER 100 WBC
P-R INTERVAL, ECG05: 146 MS
PLATELET # BLD AUTO: 239 K/UL (ref 150–400)
PMV BLD AUTO: 10.9 FL (ref 8.9–12.9)
POTASSIUM SERPL-SCNC: 4.6 MMOL/L (ref 3.5–5.1)
PROT SERPL-MCNC: 8.1 G/DL (ref 6.4–8.2)
Q-T INTERVAL, ECG07: 324 MS
QRS DURATION, ECG06: 76 MS
QTC CALCULATION (BEZET), ECG08: 424 MS
RBC # BLD AUTO: 4.72 M/UL (ref 4.1–5.7)
RBC MORPH BLD: ABNORMAL
RBC MORPH BLD: ABNORMAL
SERVICE CMNT-IMP: NORMAL
SERVICE CMNT-IMP: NORMAL
SODIUM SERPL-SCNC: 132 MMOL/L (ref 136–145)
TROPONIN I SERPL-MCNC: <0.04 NG/ML
VENTRICULAR RATE, ECG03: 103 BPM
WBC # BLD AUTO: 7.2 K/UL (ref 4.1–11.1)

## 2018-01-01 PROCEDURE — 99285 EMERGENCY DEPT VISIT HI MDM: CPT

## 2018-01-01 PROCEDURE — 65270000029 HC RM PRIVATE

## 2018-01-01 PROCEDURE — 70450 CT HEAD/BRAIN W/O DYE: CPT

## 2018-01-01 PROCEDURE — G8978 MOBILITY CURRENT STATUS: HCPCS

## 2018-01-01 PROCEDURE — 84484 ASSAY OF TROPONIN QUANT: CPT | Performed by: EMERGENCY MEDICINE

## 2018-01-01 PROCEDURE — 36415 COLL VENOUS BLD VENIPUNCTURE: CPT | Performed by: PHYSICIAN ASSISTANT

## 2018-01-01 PROCEDURE — 97116 GAIT TRAINING THERAPY: CPT

## 2018-01-01 PROCEDURE — 74011250637 HC RX REV CODE- 250/637: Performed by: HOSPITALIST

## 2018-01-01 PROCEDURE — 80053 COMPREHEN METABOLIC PANEL: CPT | Performed by: PHYSICIAN ASSISTANT

## 2018-01-01 PROCEDURE — G8979 MOBILITY GOAL STATUS: HCPCS

## 2018-01-01 PROCEDURE — 97535 SELF CARE MNGMENT TRAINING: CPT

## 2018-01-01 PROCEDURE — 74011250636 HC RX REV CODE- 250/636: Performed by: HOSPITALIST

## 2018-01-01 PROCEDURE — 97116 GAIT TRAINING THERAPY: CPT | Performed by: PHYSICAL THERAPIST

## 2018-01-01 PROCEDURE — 97165 OT EVAL LOW COMPLEX 30 MIN: CPT

## 2018-01-01 PROCEDURE — 75810000053 HC SPLINT APPLICATION

## 2018-01-01 PROCEDURE — 93005 ELECTROCARDIOGRAM TRACING: CPT

## 2018-01-01 PROCEDURE — 85025 COMPLETE CBC W/AUTO DIFF WBC: CPT | Performed by: PHYSICIAN ASSISTANT

## 2018-01-01 PROCEDURE — 97110 THERAPEUTIC EXERCISES: CPT

## 2018-01-01 PROCEDURE — 97161 PT EVAL LOW COMPLEX 20 MIN: CPT

## 2018-01-01 PROCEDURE — 73090 X-RAY EXAM OF FOREARM: CPT

## 2018-01-01 PROCEDURE — 82962 GLUCOSE BLOOD TEST: CPT

## 2018-01-01 RX ORDER — ACETAMINOPHEN 325 MG/1
650 TABLET ORAL
Status: DISCONTINUED | OUTPATIENT
Start: 2018-01-01 | End: 2018-01-01 | Stop reason: HOSPADM

## 2018-01-01 RX ORDER — SODIUM CHLORIDE 0.9 % (FLUSH) 0.9 %
5-10 SYRINGE (ML) INJECTION AS NEEDED
Status: DISCONTINUED | OUTPATIENT
Start: 2018-01-01 | End: 2018-01-01 | Stop reason: HOSPADM

## 2018-01-01 RX ORDER — CARBIDOPA AND LEVODOPA 25; 100 MG/1; MG/1
1 TABLET ORAL 3 TIMES DAILY
Status: DISCONTINUED | OUTPATIENT
Start: 2018-01-01 | End: 2018-01-01 | Stop reason: HOSPADM

## 2018-01-01 RX ORDER — SODIUM CHLORIDE 9 MG/ML
75 INJECTION, SOLUTION INTRAVENOUS CONTINUOUS
Status: DISPENSED | OUTPATIENT
Start: 2018-01-01 | End: 2018-01-01

## 2018-01-01 RX ORDER — PANTOPRAZOLE SODIUM 40 MG/1
40 TABLET, DELAYED RELEASE ORAL DAILY
Status: DISCONTINUED | OUTPATIENT
Start: 2018-01-01 | End: 2018-01-01 | Stop reason: HOSPADM

## 2018-01-01 RX ORDER — CARBIDOPA AND LEVODOPA 25; 100 MG/1; MG/1
TABLET ORAL
Qty: 120 TAB | Refills: 5 | Status: SHIPPED | OUTPATIENT
Start: 2018-01-01

## 2018-01-01 RX ORDER — SODIUM CHLORIDE 0.9 % (FLUSH) 0.9 %
5-10 SYRINGE (ML) INJECTION EVERY 8 HOURS
Status: DISCONTINUED | OUTPATIENT
Start: 2018-01-01 | End: 2018-01-01 | Stop reason: HOSPADM

## 2018-01-01 RX ADMIN — CARBIDOPA AND LEVODOPA 1 TABLET: 25; 100 TABLET ORAL at 19:29

## 2018-01-01 RX ADMIN — CARBIDOPA AND LEVODOPA 1 TABLET: 25; 100 TABLET ORAL at 15:04

## 2018-01-01 RX ADMIN — Medication 10 ML: at 06:20

## 2018-01-01 RX ADMIN — PANTOPRAZOLE SODIUM 40 MG: 40 TABLET, DELAYED RELEASE ORAL at 08:29

## 2018-01-01 RX ADMIN — CARBIDOPA AND LEVODOPA 1 TABLET: 25; 100 TABLET ORAL at 21:49

## 2018-01-01 RX ADMIN — Medication 10 ML: at 14:00

## 2018-01-01 RX ADMIN — CARBIDOPA AND LEVODOPA 1 TABLET: 25; 100 TABLET ORAL at 22:55

## 2018-01-01 RX ADMIN — Medication 10 ML: at 22:44

## 2018-01-01 RX ADMIN — CARBIDOPA AND LEVODOPA 1 TABLET: 25; 100 TABLET ORAL at 08:43

## 2018-01-01 RX ADMIN — CARBIDOPA AND LEVODOPA 1 TABLET: 25; 100 TABLET ORAL at 10:23

## 2018-01-01 RX ADMIN — Medication 10 ML: at 06:16

## 2018-01-01 RX ADMIN — CARBIDOPA AND LEVODOPA 1 TABLET: 25; 100 TABLET ORAL at 21:13

## 2018-01-01 RX ADMIN — PANTOPRAZOLE SODIUM 40 MG: 40 TABLET, DELAYED RELEASE ORAL at 08:20

## 2018-01-01 RX ADMIN — SODIUM CHLORIDE 75 ML/HR: 900 INJECTION, SOLUTION INTRAVENOUS at 19:29

## 2018-01-01 RX ADMIN — CARBIDOPA AND LEVODOPA 1 TABLET: 25; 100 TABLET ORAL at 15:05

## 2018-01-01 RX ADMIN — PANTOPRAZOLE SODIUM 40 MG: 40 TABLET, DELAYED RELEASE ORAL at 08:43

## 2018-01-01 RX ADMIN — Medication 10 ML: at 21:49

## 2018-01-01 RX ADMIN — CARBIDOPA AND LEVODOPA 1 TABLET: 25; 100 TABLET ORAL at 08:20

## 2018-01-01 RX ADMIN — Medication 10 ML: at 21:13

## 2018-01-01 RX ADMIN — CARBIDOPA AND LEVODOPA 1 TABLET: 25; 100 TABLET ORAL at 22:44

## 2018-01-01 RX ADMIN — Medication 10 ML: at 13:26

## 2018-01-01 RX ADMIN — ACETAMINOPHEN 650 MG: 325 TABLET, FILM COATED ORAL at 18:41

## 2018-01-01 RX ADMIN — CARBIDOPA AND LEVODOPA 1 TABLET: 25; 100 TABLET ORAL at 08:29

## 2018-01-01 RX ADMIN — PANTOPRAZOLE SODIUM 40 MG: 40 TABLET, DELAYED RELEASE ORAL at 10:23

## 2018-01-01 RX ADMIN — CARBIDOPA AND LEVODOPA 1 TABLET: 25; 100 TABLET ORAL at 16:35

## 2018-03-22 NOTE — MR AVS SNAPSHOT
[address] [facility] [address] [facility] [address] [phone] Patient: Yolande Casanova MRN: [id number] ZIJ:[id number] Visit Information Date & Time Provider Department Dept. Phone Encounter #  
 3/22/2018 11:00 AM Brady Parker MD [facility] [phone] [id number] Follow-up Instructions Return in about 1 month (around 4/22/2018). Your Appointments 6/13/2018  1:40 PM  
Follow Up with Brady Parker MD  
[address] [facility]) Appt Note: 6 month f/u [address] [facility] [address] [address]  
[facility] [address] [address] Upcoming Health Maintenance Date Due Hepatitis C Screening 1950 DTaP/Tdap/Td series (1 - Tdap) 3/5/1972 FOBT Q 1 YEAR AGE 50-75 3/5/2001 ZOSTER VACCINE AGE 60> 1/5/2011 GLAUCOMA SCREENING Q2Y 3/5/2016 Pneumococcal 65+ Low/Medium Risk (2 of 2 - PCV13) 10/10/2017 MEDICARE YEARLY EXAM 3/14/2018 Allergies as of 3/22/2018  Review Complete On: 12/13/2017 By: Bandar Shane LPN No Known Allergies Current Immunizations  Never Reviewed Name Date Influenza High Dose Vaccine PF 10/10/2017  3:22 PM  
 Pneumococcal Polysaccharide (PPSV-23) 10/10/2016 Not reviewed this visit You Were Diagnosed With   
  
 Codes Comments Parkinson disease ([facility]    -  Primary ICD-10-CM: G20 
ICD-9-CM: 332.0 Vitals BP Pulse Weight(growth percentile) SpO2 BMI Smoking Status 126/68 74 193 lb (87.5 kg) 93% 27.69 kg/m2 Never Smoker BMI and BSA Data Body Mass Index Body Surface Area  
 27.69 kg/m 2 2.08 m 2 Preferred Pharmacy Pharmacy Name Phone [address] [phone] Your Updated Medication List  
  
   
 This list is accurate as of 3/22/18 11:47 AM.  Always use your most recent med list.  
  
  
  
  
 carbidopa-levodopa  mg per tablet Commonly known as:  SINEMET Take 1 Tab by mouth three (3) times daily. Indications: Parkinsonism FISH -1,400 mg Cpdr  
Generic drug:  omega 3-dha-epa-fish oil Take  by mouth.  
  
 pantoprazole 40 mg tablet Commonly known as:  PROTONIX Take 1 Tab by mouth daily. Follow-up Instructions Return in about 1 month (around 4/22/2018). Patient Instructions PRESCRIPTION REFILL POLICY [facility] Statement to Patients April 1, 2014 In an effort to ensure the large volume of patient prescription refills is processed in the most efficient and expeditious manner, we are asking our patients to assist us by calling your Pharmacy for all prescription refills, this will include also your  Mail Order Pharmacy. The pharmacy will contact our office electronically to continue the refill process. Please do not wait until the last minute to call your pharmacy. We need at least 48 hours (2days) to fill prescriptions. We also encourage you to call your pharmacy before going to  your prescription to make sure it is ready. With regard to controlled substance prescription refill requests (narcotic refills) that need to be picked up at our office, we ask your cooperation by providing us with at least 72 hours (3days) notice that you will need a refill. We will not refill narcotic prescription refill requests after 4:00pm on any weekday, Monday through Thursday, or after 2:00pm on Fridays, or on the weekends. We encourage everyone to explore another way of getting your prescription refill request processed using Ludi, our patient web portal through our electronic medical record system.  Archetypest is an efficient and effective way to communicate your medication request directly to the office and downloadable as an lewis on your smart phone . [employer] also features a review functionality that allows you to view your medication list as well as leave messages for your physician. Are you ready to get connected? If so please review the attatched instructions or speak to any of our staff to get you set up right away! Thank you so much for your cooperation. Should you have any questions please contact our Practice Administrator. The Physicians and Staff,  [facility] Parkinson's Disease: Care Instructions Your Care Instructions Parkinson's disease can cause tremors, stiffness, and problems with movement. Severe or advanced cases can also cause problems with thinking. In Parkinson's disease, part of the brain cannot make enough dopamine, a chemical that helps control movement. Taking your medicines correctly and getting regular exercise may help you maintain your quality of life. There are many things that can cause Parkinson's disease symptoms, including some medicine, some toxins, and trauma to the head. The cause in most cases is not known. Follow-up care is a key part of your treatment and safety. Be sure to make and go to all appointments, and call your doctor if you are having problems. It's also a good idea to know your test results and keep a list of the medicines you take. How can you care for yourself at home? General care · Take your medicines exactly as prescribed. Call your doctor if you think you are having a problem with your medicine. · Make sure your home is safe: 
¨ Place furniture so that you have something to hold on to as you walk around the house. ¨ Use chairs that make it easier to sit down and stand up. ¨ Group the things you use most, such as reading glasses, keys, and the telephone, in one easy-to-reach place. ¨ Tack down rugs so that you do not trip. ¨ Put no-slip tape and handrails in the tub to prevent falls. · Use a cane, walker, or scooter if your doctor suggests it. · Keep up your normal activities as much as you can. · Find ways to manage stress, which can make symptoms worse. · Spend time with family and friends. Join a support group for people with Parkinson's disease if you want extra help. · Depression is common with this condition. Tell your doctor if you have trouble sleeping, are eating too much or are not hungry, or feel sad or tearful all the time. Depression can be treated with medicine and counseling. Diet and exercise · Eat a balanced diet. · If you are taking levodopa, do not eat protein at the same time you take your medicine. Levodopa may not work as well if you take it at the same time you eat protein. You can eat normal amounts of protein. Talk to your doctor if you have questions. · If you have problems swallowing, change how and what you eat: ¨ Try thick drinks, such as milk shakes. They are easier to swallow than other fluids. ¨ Do not eat foods that crumble easily. These can cause choking. ¨ Use a  to prepare food. Soft foods need less chewing. ¨ Eat small meals often so that you do not get tired from eating heavy meals. · Drink plenty of water and eat a high-fiber diet to prevent constipation. Parkinson's-and the medicines that treat it-may slow your intestines. · Get exercise on most days. Work with your doctor to set up a program of walking, swimming, or other exercise you are able to do. When should you call for help? Call your doctor now or seek immediate medical care if: 
? · You have a change in your symptoms. ? · You develop other problems from your condition, such as: ¨ Injury from a fall. ¨ Thinking or memory problems. ¨ A urinary tract infection (burning pain when urinating). ? Watch closely for changes in your health, and be sure to contact your doctor if: 
? · You lose weight because of problems with eating. ? · You want more information about your condition or your medicines. Where can you learn more? Go to http://marli-jeremy.info/. Enter H290 in the search box to learn more about \"Parkinson's Disease: Care Instructions. \" Current as of: October 14, 2016 Content Version: 11.4 © 7088-8150 [employer]. Care instructions adapted under license by [facility] (which disclaims liability or warranty for this information). If you have questions about a medical condition or this instruction, always ask your healthcare professional. [address] any warranty or liability for your use of this information. Introducing [facility] & HEALTH SERVICES! Dear Elli Clarke: 
Thank you for requesting a j-Grab account. Our records indicate that you already have an active j-Grab account. You can access your account anytime at [email or web address] Did you know that you can access your hospital and ER discharge instructions at any time in j-Grab? You can also review all of your test results from your hospital stay or ER visit. Additional Information If you have questions, please visit the Frequently Asked Questions section of the j-Grab website at [email or web address] Remember, j-Grab is NOT to be used for urgent needs. For medical emergencies, dial 911. Now available from your iPhone and Android! Please provide this summary of care documentation to your next provider. Your primary care clinician is listed as Kamlesh Sosa. If you have any questions after today's visit, please call [phone].

## 2018-03-22 NOTE — PROGRESS NOTES
Neurology Progress Note    Patient ID:  Nory Ramos  [id number]  45 y.o.  1950      Subjective:   History:  Krish Estrella is a 78 y. o. male who  has a past medical history of Diverticulitis (2017); Hypertension; and Thromboembolus (HCC). who for the past 1 yr, noted progressive bilateral hand resting tremor, started in both hands, worse on the R side with gait disturbance for the past 6 months had to use a cane, shuffling and hesitates, clumsiness of hands concerning for Parkinsons disease. Patient also has some history of lightheadedness, lack of sweat and low BP concerning for autonomic dysfunction associated with Parkinson. Patient has been taking Sinemet 25/100 TID (7 AM, 2 PM, 8 PM) but noted \"off periods\" in the past  few months described as being off balanced with increased tremors associated with progression of his condition. Patient also has stopped going to physical therapy. ROS:  Per HPI-  Otherwise the remainder of ROS was negative    Social Hx  Social History     Social History    Marital status:      Spouse name: N/A    Number of children: N/A    Years of education: N/A     Social History Main Topics    Smoking status: Never Smoker    Smokeless tobacco: Never Used    Alcohol use Yes      Comment: occasional    Drug use: No    Sexual activity: Yes     Partners: Female     Other Topics Concern    None     Social History Narrative       Meds:  Current Outpatient Prescriptions on File Prior to Visit   Medication Sig Dispense Refill    pantoprazole (PROTONIX) 40 mg tablet Take 1 Tab by mouth daily. 90 Tab 3    omega 3-dha-epa-fish oil (FISH OIL) 900-1,400 mg cpDR Take  by mouth.  carbidopa-levodopa (SINEMET)  mg per tablet Take 1 Tab by mouth three (3) times daily. Indications: Parkinsonism 90 Tab 5     No current facility-administered medications on file prior to visit.         Imaging:    CT Results (recent):    Results from [facility] Encounter encounter on 11/04/17   CT HEAD WO CONT   Narrative EXAM:  CT HEAD WO CONT    INDICATION:   AMS, lethargy, urinary incontinence, shuffle gait, ? NPH    COMPARISON: None. CONTRAST:  None. TECHNIQUE: Unenhanced CT of the head was performed using 5 mm images. Brain and  bone windows were generated. CT dose reduction was achieved through use of a  standardized protocol tailored for this examination and automatic exposure  control for dose modulation. FINDINGS:  The ventricles and sulci are normal in size, shape and configuration and  midline. There is no significant white matter disease. There is no intracranial  hemorrhage, extra-axial collection, mass, mass effect or midline shift. The  basilar cisterns are open. No acute infarct is identified. The bone windows  demonstrate no abnormalities. The visualized portions of the paranasal sinuses  and mastoid air cells are clear. Impression IMPRESSION: Normal CT of the head. MRI Results (recent):  No results found for this or any previous visit. IR Results (recent):  No results found for this or any previous visit. VAS/US Results (recent):  No results found for this or any previous visit. Reviewed records in [employer] tab today    Lab Review     No visits with results within 3 Month(s) from this visit.   Latest known visit with results is:    Admission on 11/04/2017, Discharged on 11/04/2017   Component Date Value Ref Range Status    WBC 11/04/2017 6.3  4.1 - 11.1 K/uL Final    RBC 11/04/2017 4.42  4.10 - 5.70 M/uL Final    HGB 11/04/2017 12.2  12.1 - 17.0 g/dL Final    HCT 11/04/2017 36.8  36.6 - 50.3 % Final    MCV 11/04/2017 83.3  80.0 - 99.0 FL Final    MCH 11/04/2017 27.6  26.0 - 34.0 PG Final    MCHC 11/04/2017 33.2  30.0 - 36.5 g/dL Final    RDW 11/04/2017 14.1  11.5 - 14.5 % Final    PLATELET 17/31/7163 707  150 - 400 K/uL Final    NEUTROPHILS 11/04/2017 57  32 - 75 % Final    LYMPHOCYTES 11/04/2017 18  12 - 49 % Final  MONOCYTES 11/04/2017 18* 5 - 13 % Final    EOSINOPHILS 11/04/2017 5  0 - 7 % Final    BASOPHILS 11/04/2017 2* 0 - 1 % Final    ABS. NEUTROPHILS 11/04/2017 3.6  1.8 - 8.0 K/UL Final    ABS. LYMPHOCYTES 11/04/2017 1.1  0.8 - 3.5 K/UL Final    ABS. MONOCYTES 11/04/2017 1.1* 0.0 - 1.0 K/UL Final    ABS. EOSINOPHILS 11/04/2017 0.3  0.0 - 0.4 K/UL Final    ABS. BASOPHILS 11/04/2017 0.1  0.0 - 0.1 K/UL Final    Sodium 11/04/2017 130* 136 - 145 mmol/L Final    Potassium 11/04/2017 4.3  3.5 - 5.1 mmol/L Final    Chloride 11/04/2017 97  97 - 108 mmol/L Final    CO2 11/04/2017 25  21 - 32 mmol/L Final    Anion gap 11/04/2017 8  5 - 15 mmol/L Final    Glucose 11/04/2017 100  65 - 100 mg/dL Final    BUN 11/04/2017 42* 6 - 20 MG/DL Final    Creatinine 11/04/2017 1.85* 0.70 - 1.30 MG/DL Final    BUN/Creatinine ratio 11/04/2017 23* 12 - 20   Final    GFR est AA 11/04/2017 45* >60 ml/min/1.73m2 Final    GFR est non-AA 11/04/2017 37* >60 ml/min/1.73m2 Final    Comment: Estimated GFR is calculated using the IDMS-traceable Modification of Diet in Renal Disease (MDRD) Study equation, reported for both  Americans (GFRAA) and non- Americans (GFRNA), and normalized to 1.73m2 body surface area. The physician must decide which value applies to the patient. The MDRD study equation should only be used in individuals age 25 or older. It has not been validated for the following: pregnant women, patients with serious comorbid conditions, or on certain medications, or persons with extremes of body size, muscle mass, or nutritional status.  Calcium 11/04/2017 9.1  8.5 - 10.1 MG/DL Final    Bilirubin, total 11/04/2017 0.8  0.2 - 1.0 MG/DL Final    ALT (SGPT) 11/04/2017 23  12 - 78 U/L Final    AST (SGOT) 11/04/2017 35  15 - 37 U/L Final    Alk.  phosphatase 11/04/2017 60  45 - 117 U/L Final    Protein, total 11/04/2017 7.5  6.4 - 8.2 g/dL Final    Albumin 11/04/2017 3.3* 3.5 - 5.0 g/dL Final    Globulin 11/04/2017 4.2* 2.0 - 4.0 g/dL Final    A-G Ratio 11/04/2017 0.8* 1.1 - 2.2   Final    Color 11/04/2017 YELLOW/STRAW    Final    Color Reference Range: Straw, Yellow or Dark Yellow    Appearance 11/04/2017 CLEAR  CLEAR   Final    Specific gravity 11/04/2017 1.016  1.003 - 1.030   Final    pH (UA) 11/04/2017 5.0  5.0 - 8.0   Final    Protein 11/04/2017 NEGATIVE   NEG mg/dL Final    Glucose 11/04/2017 NEGATIVE   NEG mg/dL Final    Ketone 11/04/2017 NEGATIVE   NEG mg/dL Final    Bilirubin 11/04/2017 NEGATIVE   NEG   Final    Blood 11/04/2017 NEGATIVE   NEG   Final    Urobilinogen 11/04/2017 0.2  0.2 - 1.0 EU/dL Final    Nitrites 11/04/2017 NEGATIVE   NEG   Final    Leukocyte Esterase 11/04/2017 NEGATIVE   NEG   Final    WBC 11/04/2017 0-4  0 - 4 /hpf Final    RBC 11/04/2017 0-5  0 - 5 /hpf Final    Epithelial cells 11/04/2017 FEW  FEW /lpf Final    Epithelial cell category consists of squamous cells and /or transitional urothelial cells. Renal tubular cells, if present, are separately identified as such.     Bacteria 11/04/2017 NEGATIVE   NEG /hpf Final    UA:UC IF INDICATED 11/04/2017 CULTURE NOT INDICATED BY UA RESULT  CNI   Final    Hyaline cast 11/04/2017 0-2  0 - 5 /lpf Final    Ventricular Rate 11/04/2017 100  BPM Final    Atrial Rate 11/04/2017 100  BPM Final    P-R Interval 11/04/2017 148  ms Final    QRS Duration 11/04/2017 80  ms Final    Q-T Interval 11/04/2017 330  ms Final    QTC Calculation (Bezet) 11/04/2017 425  ms Final    Calculated P Axis 11/04/2017 55  degrees Final    Calculated R Axis 11/04/2017 81  degrees Final    Calculated T Axis 11/04/2017 34  degrees Final    Diagnosis 11/04/2017    Final                    Value:Normal sinus rhythm  No previous ECGs available  Confirmed by Trice Peres M.D., Erin William ([id number]) on 11/5/2017 3:05:16 PM      Troponin-I, Qt. 11/04/2017 <0.04  <0.05 ng/mL Final    Comment: The presence of detectable troponin above the reference range indicates myocardial injury which may be due to ischemia, myocarditis, trauma, etc.  Clinical correlation is necessary to establish the significance of this finding. Sequential testing is recommended to determine if the typical rise and fall of cTnI is demonstrated. Note:  Cardiac troponin I has a relatively long half life and may be present well after the CK MB has returned to baseline. The reference range is based on the 99th percentile of the referent population.  Magnesium 11/04/2017 1.4* 1.6 - 2.4 mg/dL Final         Objective:       Exam:  Visit Vitals    /68    Pulse 74    Wt 87.5 kg (193 lb)    SpO2 93%    BMI 27.69 kg/m2     Gen: Awake, alert, follows commands  Appropriate appearance, normal speech. Oriented to all spheres. No visual field defect on confrontation exam.  Full eyes movement, with no nystagmus, no diplopia, no ptosis. Normal gag and swallow. All remaining cranial nerves were normal  Motor: more expression in face with less rigidity, tremor and bradykinesia  Sensory: intact to LT, PP, vibration, and JPS  Reflexes: 2+ UE and knees, absent ankles throughout; Down going toes  Coordination: Good FTN and HTS  Gait: able to stand with 2 steps pivot     Assessment:       ICD-10-CM ICD-9-CM    1. Parkinson disease ([facility] G20 332.0            Plan:   1. Given samples of Xadago 50 mg for 2 weeks. Patient to call for update, if no side-effects, will increase to 100 mg every day   2. Adjust Sinemet 25/100 TID schedule (7 AM, 12 noon and 5 PM)  3. Discussed the importance of going to Physical therapy   4. Melatonin for sleep issue     Follow-up Disposition:  Return in about 1 month (around 4/22/2018).           Gladys Warren MD  Diplomate, American Board of Psychiatry and Neurology  Diplomate, Neuromuscular Medicine  Diplomate, American Board of Electrodiagnostic Medicine

## 2018-03-22 NOTE — PATIENT INSTRUCTIONS
[facility]   Statement to Patients  April 1, 2014      In an effort to ensure the large volume of patient prescription refills is processed in the most efficient and expeditious manner, we are asking our patients to assist us by calling your Pharmacy for all prescription refills, this will include also your  Mail Order Pharmacy. The pharmacy will contact our office electronically to continue the refill process. Please do not wait until the last minute to call your pharmacy. We need at least 48 hours (2days) to fill prescriptions. We also encourage you to call your pharmacy before going to  your prescription to make sure it is ready. With regard to controlled substance prescription refill requests (narcotic refills) that need to be picked up at our office, we ask your cooperation by providing us with at least 72 hours (3days) notice that you will need a refill. We will not refill narcotic prescription refill requests after 4:00pm on any weekday, Monday through Thursday, or after 2:00pm on Fridays, or on the weekends. We encourage everyone to explore another way of getting your prescription refill request processed using [employer], our patient web portal through our electronic medical record system. [employer] is an efficient and effective way to communicate your medication request directly to the office and  downloadable as an lewis on your smart phone . [employer] also features a review functionality that allows you to view your medication list as well as leave messages for your physician. Are you ready to get connected? If so please review the attatched instructions or speak to any of our staff to get you set up right away! Thank you so much for your cooperation. Should you have any questions please contact our Practice Administrator.     The Physicians and Staff,  [facility]        Parkinson's Disease: Care Instructions  Your Care Instructions  Parkinson's disease can cause tremors, stiffness, and problems with movement. Severe or advanced cases can also cause problems with thinking. In Parkinson's disease, part of the brain cannot make enough dopamine, a chemical that helps control movement. Taking your medicines correctly and getting regular exercise may help you maintain your quality of life. There are many things that can cause Parkinson's disease symptoms, including some medicine, some toxins, and trauma to the head. The cause in most cases is not known. Follow-up care is a key part of your treatment and safety. Be sure to make and go to all appointments, and call your doctor if you are having problems. It's also a good idea to know your test results and keep a list of the medicines you take. How can you care for yourself at home? General care  · Take your medicines exactly as prescribed. Call your doctor if you think you are having a problem with your medicine. · Make sure your home is safe:  ¨ Place furniture so that you have something to hold on to as you walk around the house. ¨ Use chairs that make it easier to sit down and stand up. ¨ Group the things you use most, such as reading glasses, keys, and the telephone, in one easy-to-reach place. ¨ Tack down rugs so that you do not trip. ¨ Put no-slip tape and handrails in the tub to prevent falls. · Use a cane, walker, or scooter if your doctor suggests it. · Keep up your normal activities as much as you can. · Find ways to manage stress, which can make symptoms worse. · Spend time with family and friends. Join a support group for people with Parkinson's disease if you want extra help. · Depression is common with this condition. Tell your doctor if you have trouble sleeping, are eating too much or are not hungry, or feel sad or tearful all the time. Depression can be treated with medicine and counseling. Diet and exercise  · Eat a balanced diet.   · If you are taking levodopa, do not eat protein at the same time you take your medicine. Levodopa may not work as well if you take it at the same time you eat protein. You can eat normal amounts of protein. Talk to your doctor if you have questions. · If you have problems swallowing, change how and what you eat:  ¨ Try thick drinks, such as milk shakes. They are easier to swallow than other fluids. ¨ Do not eat foods that crumble easily. These can cause choking. ¨ Use a  to prepare food. Soft foods need less chewing. ¨ Eat small meals often so that you do not get tired from eating heavy meals. · Drink plenty of water and eat a high-fiber diet to prevent constipation. Parkinson's-and the medicines that treat it-may slow your intestines. · Get exercise on most days. Work with your doctor to set up a program of walking, swimming, or other exercise you are able to do. When should you call for help? Call your doctor now or seek immediate medical care if:  ? · You have a change in your symptoms. ? · You develop other problems from your condition, such as:  ¨ Injury from a fall. ¨ Thinking or memory problems. ¨ A urinary tract infection (burning pain when urinating). ? Watch closely for changes in your health, and be sure to contact your doctor if:  ? · You lose weight because of problems with eating. ? · You want more information about your condition or your medicines. Where can you learn more? Go to http://marli-jeremy.info/. Enter I471 in the search box to learn more about \"Parkinson's Disease: Care Instructions. \"  Current as of: October 14, 2016  Content Version: 11.4  © 9438-0973 [employer]. Care instructions adapted under license by [employer] (which disclaims liability or warranty for this information).  If you have questions about a medical condition or this instruction, always ask your healthcare professional. Richy [employer] disclaims any warranty or liability for your use of this information.

## 2018-03-22 NOTE — LETTER
3/22/2018 11:51 AM 
 
Patient:  Carina Hamilton YOB: 1950 Date of Visit: 3/22/2018 Dear Erinn Sood MD 
[address] [place] [address] VIA In Basket 
 : Thank you for referring Mr. Meghan Marino to me for evaluation/treatment. Below are the relevant portions of my assessment and plan of care. If you have questions, please do not hesitate to call me. I look forward to following Mr. Becky Iraheta along with you. Sincerely, Judy Mejia MD

## 2018-03-29 NOTE — TELEPHONE ENCOUNTER
Order placed for Xadago 100 mg, # 30 tabs, PO, per Verbal Order from Dr. Miguel Hardin on 3/29/2018 due to [address] Disease.
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
## 2018-04-07 NOTE — CONSULTS
Ortho Consult      Patient: Kane Bain                   MRN: [id number]  Sex: male  YOB: 1950           Age: 80 y.o. TigerText consult with Dr. Kei Castro who describes a 69yo gentleman with a distal ulnar shaft fracture as the result of a fall. NVI per ER. Recommend ulnar gutter splint and follow up with Dr. Padma Mott. Thank you for allowing us to take part in this patients care.       Gioia Essex, PA  Orthopaedic Surgery [place]  [id number]

## 2018-04-07 NOTE — PROGRESS NOTES
CM called for consult with patient and his son. His son states that he has fallen 4 x in a week. The states that dad and mom live with he and his wife. His mother has had a severe stroke and is unable to care for herself. His father has been to see Dr. Guero Siegel in the past 2 weeks and he changed the patients medication which seems to be increasing patients falls. He states that dad is also, having hallucinations. Patient has not had any 3 midnight stays in the hospital. He is going for gait theraphy from home. He uses bedside commode and now he is a two person assist. His daughter-in-law works and he is in the [employer]. They have 4 children and he states at this time they are unable to care for patient. He would like to have assistance in finding placement for dad if possible. It is undetermined if Patient will be admitted or sent home. If he is sent home I will have [facility]  follow up  With patients son on Monday to assist with possible options for dad. If he is admitted CM will work on sending referrals to have patients needs met. Patients Insurance card has changed: He has [employer] [id number] [employer]. Patients daughter-in-law stated he is suppose to use this instead of his [employer] A&B. I asked registration to scan card into chart but it said not authorized. Ramiro Bazzi RN CRM     Patients son Adrienne Somers ([phone]). Please contact him concerning patient.

## 2018-04-07 NOTE — PROGRESS NOTES
TRANSFER - IN REPORT:    Verbal report received from Tony Fernandez RN (name) on Layla Sow  being received from ED (unit) for routine post - op      Report consisted of patients Situation, Background, Assessment and   Recommendations(SBAR). Information from the following report(s) SBAR was reviewed with the receiving nurse. Opportunity for questions and clarification was provided. Assessment completed upon patients arrival to unit and care assumed.

## 2018-04-07 NOTE — CONSULTS
[address]    Isabela Kerr  MR#: [id number]  : 1950  ACCOUNT #: [de-identified]   DATE OF SERVICE: 2018    NEUROLOGY CONSULT NOTE    REQUESTING PHYSICIAN:  Dr. Joan Pineda. REASON FOR CONSULTATION:  Parkinson disease. HISTORY OF PRESENT ILLNESS:  The patient is a 52-year-old male who has Parkinson disease and who follows with Dr. Nelly Phoenix at the [facility]. He is here in the ER and getting admitted because of a fall he sustained earlier today. History is provided by the patient as well as his son. He is currently on Sinemet 25/100 three times a day and Xadago 100 mg p.o. daily. His symptoms of tremors, bradykinesia, and rigidity are controlled, but not completely. In that setting, he has been having some falls. He was advised to go for physical therapy per the last note from Dr. Korin Gr, but it does not seem like that has been happening. Today he had a fall while trying to get onto the commode. He says that he did not pass out. He has full recollection. There was no seizure or convulsive activity or incontinence that was noticed. He fell towards the left side, he did not hit his head, but hit his left forearm and sustained an ulnar fracture. He is getting admitted for management of that. Otherwise, no other new symptoms or concerns. REVIEW OF SYSTEMS:  Positive for a fall, left upper extremity pain, rigidity, tremors, bradykinesia. All other systems reviewed and are negative except as mentioned in the HPI. PAST MEDICAL HISTORY:  Positive for Parkinson disease with some dysautonomic symptoms. He has also had diverticulitis and also has hypertension. SOCIAL HISTORY:  He lives with his son. He moved here from [place]. FAMILY HISTORY:  Positive for diabetes and heart disease in father. ALLERGIES:  NO KNOWN DRUG ALLERGIES.       MEDICATIONS:  Sinemet 25/100 three times a day, Xadago 100 mg p.o. daily, Protonix 40 mg daily, and fish oil 1 capsule daily. PHYSICAL EXAMINATION:  VITAL SIGNS:  Include temperature of 97.8, pulse of 101, respiratory rate 20, BP of 136/71, O2 sat of 96%. CONSTITUTIONAL:  He is a well-developed male. He is in some distress because of left upper extremity pain. LUNGS:  Nonlabored breathing. CARDIOVASCULAR:  Regular rate and rhythm. NEUROLOGIC:  High mental functions:  He is awake, alert, oriented x3. His speech and attention are within normal limits. There is some low volume to his speech. He has slightly impaired recent and remote memory. Fund of knowledge is normal.  Cranial nerves:  Pupils are equally reactive. Extraocular movements are intact. Visual fields are grossly full. He does seem to have some diminished prominence of the right nasolabial fold, but otherwise face is symmetric. V1 through V3 intact. Hearing is intact. Tongue is midline. Uvula is midline. On motor exam, his left upper extremity exam is limited by the pain that he has, but he has at least antigravity strength. Other extremities are 5/5. He does have a tremor at rest as well as cogwheel rigidity, more on the right than the left. Sensory is intact to temperature and touch. Gait was deferred. CT of the head shows no acute intracranial abnormalities including no acute intracranial hemorrhage, mass, or infarct. LABORATORY DATA:  Include a white count of 7.2, hemoglobin 11.7, platelets 779. Sodium is 132, potassium 4.6, BUN 22, creatinine 1.09.    ASSESSMENT AND PLAN:  A 78-year-old male with Parkinson disease who has sustained a fall and has had an ulnar fracture, is being admitted for further management. At this point in time, the management of the ulnar fracture would be per orthopedic service. I would not change any of his Parkinson's medication regimen at this point in time. He would need to follow-up with Dr Adalberto Velazquez for changes in does and/or medications as an outpatient.  He certainly would need physical therapy assessment. We will be available to answer any questions or give our recommendations if needed.       MD SAMIR Watts / VEE  D: 04/07/2018 16:06     T: 04/07/2018 17:11  JOB #: [id number]

## 2018-04-07 NOTE — H&P
Dictation ID :[id number]    1. Parkinson's   2. Ulner frac  3.  Fall x [address].  Adult Hospitalist

## 2018-04-07 NOTE — ED TRIAGE NOTES
Triage note: Pt's son states the patient has fallen frequently in the last few weeks and was diagnosed with Parkinson's. Pt fell this morning and hurt his left wrist.  Pt has been getting worse since starting the medication and they are unsure if the medication is working. Pt is unable to complete ADL's.

## 2018-04-07 NOTE — H&P
[address]  ACUTE CARE HISTORY AND PHYSICAL    Uli Morillo  MR#: [id number]  : 1950  ACCOUNT #: [de-identified]   DATE OF SERVICE: 2018    PRIMARY CARE PHYSICIAN:  Jose Denson MD.    NEUROLOGIST:  Dr. Claudean Hull. CHIEF COMPLAINT:  Multiple falls. History obtained from the patient and his son at bedside. HISTORY OF PRESENT ILLNESS:  Patient is a 81-year-old male with a diagnosis of Parkinson's, brought in by son with the complaints of multiple falls in the last few weeks. The patient also fell this morning, hurt his wrist, and was found to have a nondisplaced left ulnar fracture. The patient said that recently he is unable to be steady on his feet. He had a couple of visits for medication adjustment with Dr. Chely Balderrama but did not help. Today, he felt much worse. His wife and the family cannot take care of him, so they decided to come to the ER. He said that his fall was accidental in nature when he was trying to sit. He just fell on the ground and he did not lose his consciousness. He has a small healing scab on the side of the head from the fall earlier this week. He also was complaining of wrist pain. A CT scan was done. No acute intracranial hemorrhage was found. He was admitted for further management. PAST MEDICAL HISTORY:  1. As above. 2.  Parkinson disease. PAST SURGICAL HISTORY:  Noncontributory. SOCIAL HISTORY:  Lives with family. Nonsmoker, nonalcoholic. MEDICATIONS:  Reviewed. 1.  Levodopa/carbidopa 1 tablet 3 times daily. 2.  Pantoprazole 40 mg daily. 3.  Safinamide 100 mg tablet by mouth daily. PHYSICAL EXAMINATION:  VITAL SIGNS:  116/72, pulse 101, temperature 97.8, respiratory rate 20, saturation 95% on room air. GENERAL:  Elderly male lying in bed, not in acute distress. HEENT:  Normocephalic, atraumatic. There is mild resting tremor seen.   CHEST:  Clear to auscultation bilateral.  There are chronic skin changes. HEART:  S1, S2 normal.  Regular. NEUROLOGIC:  Alert, oriented to time, place, and person. There is some resting tremor. There is not much rigidity felt while examining the hands and limbs, although left hand could not be moved due to pain. LABORATORY DATA:  Hematology essentially normal.  Hemoglobin is 11.7, hematocrit 38, and platelets of 847. Urinalysis not done. Chemistry:  Sodium 132, potassium 4.6, chloride 96, carbon dioxide 27, anion gap of 9, BUN 22, creatinine 1.09, albumin 2.5. AST and ALT within normal limits. X-ray forearms showing acute oblique fracture of the distal diaphysis of the left ulna with 1/2 shaft with medial and dorsal displacement of the distal fracture fragment. EKG was pending at the time of admission. CT head, no acute intracranial hemorrhage, mass, or infarct. ASSESSMENT AND PLAN:  A 41-year-old with history of Parkinson's, who was admitted for multiple falls. PLAN:  1. Admit the patient to medical floor. I will consult neurology for further adjustment of medication. The patient was following with Dr. Beatrice Barr but was unable to control his symptoms. For the moment, I will continue the fall precautions and will follow recommendation. 2.  Fracture of the left ulnar nondisplaced. Consult orthopedics for the cast.  We will follow up with recommendation. 3.  Continue Parkinson's meds, which are prescribed by Dr. Beatrice Barr at this point. 4.  PT, OT, and case management consult. The patient will probably need rehabilitation. 5.  DVT prophylaxis with SCD. 6.  Further management as per clinical course.       MD RASHMI Ortiz / VEE  D: 04/07/2018 15:26     T: 04/07/2018 16:18  JOB #: [id number]  CC: Luisito Ambriz MD

## 2018-04-07 NOTE — CONSULTS
[id number] -- full dictated consult      Patient with PD who fell and had an ulnar fracture. Management per orthopedics    Continue his current PD medications.  Any changes would be more suitable as an outpatient when he is more stable    PT/OT    Latonia Griffin MD

## 2018-04-07 NOTE — PROGRESS NOTES
Admission Medication Reconciliation:    Information obtained from:  Patient's son, RxQuery/chart review    Comments/Recommendations: Updated PTA meds/reviewed patient's allergies. 1)  Medications added/deleted/changed: none    2)  Patient also takes melatonin for sleep and fiber supplements in the morning, but his son could not remember the doses. 3)  Of note: patient usually takes Sinemet 25/100 mg one tablet PO TID at 0700, 1200, and 1700. He received his 0700 dose today, but did not take his 1200 dose prior to arriving at the hospital.        Significant PMH/Disease States:   Past Medical History:   Diagnosis Date    Diverticulitis 2017    Hypertension     [facility] disease ([facility]     Thromboembolus ([facility] [address] [facility]     15 years ago       Chief Complaint for this Admission:    Chief Complaint   Patient presents with    Fall    Wrist Pain       Allergies:  Review of patient's allergies indicates no known allergies. Prior to Admission Medications:   Prior to Admission Medications   Prescriptions Last Dose Informant Patient Reported? Taking?   carbidopa-levodopa (SINEMET)  mg per tablet 4/7/2018 at 0700  No Yes   Sig: Take 1 Tab by mouth three (3) times daily. Indications: Parkinsonism   omega 3-dha-epa-fish oil (FISH OIL) 900-1,400 mg cpDR 4/7/2018 at 0700  Yes Yes   Sig: Take 1 Cap by mouth daily. pantoprazole (PROTONIX) 40 mg tablet 4/7/2018 at 0700  No Yes   Sig: Take 1 Tab by mouth daily. safinamide (XADAGO) 100 mg tab 4/7/2018 at 0700  No Yes   Sig: Take 100 mg by mouth daily. Facility-Administered Medications: None     Thank you for allowing me to participate in the care of this patient. Please contact the medication reconciliation pharmacist () with any questions.       Doug Krishna, PharmD Candidate [id number]

## 2018-04-08 NOTE — PROGRESS NOTES
Bedside and verbal shift change report given to Anjelica (oncoming nurse) by Miguel (offgoing nurse). Report included the following information SBAR, Kardex and Intake/Output.

## 2018-04-08 NOTE — PROGRESS NOTES
Bedside and Verbal shift change report given to SANTO Constantino (oncoming nurse) by Nabil Mauro RN (offgoing nurse). Report included the following information SBAR.

## 2018-04-08 NOTE — PROGRESS NOTES
Primary Nurse Serge Dudley RN and Velma Calle RN performed a dual skin assessment on this patient No impairment noted  Tray score is 17

## 2018-04-08 NOTE — CONSULTS
ORTHO CONSULT    Subjective:     Date of Consultation:  April 8, 2018    Referring Physician:  ER    Climmie Leader is a 80 y.o. presenting with left arm pain after a fall. He has had multiple falls recently and he is currently being treated for Parkinsons. He denies any other injuries with his fall. No LOC. His pain is dull and aching in nature. He denies any elbow pain. Patient Active Problem List    Diagnosis Date Noted    Fall 04/07/2018    Parkinson disease ([address] 12/13/2017     Family History   Problem Relation Age of Onset    Cancer Mother      bone    Diabetes Father     Heart Disease Father       Social History   Substance Use Topics    Smoking status: Never Smoker    Smokeless tobacco: Never Used    Alcohol use Yes      Comment: occasional     Past Medical History:   Diagnosis Date    Diverticulitis 2017    Hypertension     [facility] [address] [facility]     Thromboembolus ([facility] [address] [facility]     15 years ago      Past Surgical History:   Procedure Laterality Date    HX APPENDECTOMY      HX COLONOSCOPY  2012      Prior to Admission medications    Medication Sig Start Date End Date Taking? Authorizing Provider   safinamide (XADAGO) 100 mg tab Take 100 mg by mouth daily. 3/29/18  Yes Yolande Garcia MD   pantoprazole (PROTONIX) 40 mg tablet Take 1 Tab by mouth daily. 12/13/17  Yes Ivette Sosa MD   omega 6-ljz-btm-fish oil (FISH OIL) 900-1,400 mg cpDR Take 1 Cap by mouth daily. Yes Historical Provider   carbidopa-levodopa (SINEMET)  mg per tablet Take 1 Tab by mouth three (3) times daily. Indications: Parkinsonism 11/8/17  Yes Yolande Garcia MD     Current Facility-Administered Medications   Medication Dose Route Frequency    carbidopa-levodopa (SINEMET)  mg per tablet 1 Tab  1 Tab Oral TID    pantoprazole (PROTONIX) tablet 40 mg  40 mg Oral DAILY    . PHARMACY TO SUBSTITUTE PER PROTOCOL    Per Protocol    sodium chloride (NS) flush 5-10 mL  5-10 mL IntraVENous Q8H    sodium chloride (NS) flush 5-10 mL  5-10 mL IntraVENous PRN    acetaminophen (TYLENOL) tablet 650 mg  650 mg Oral Q4H PRN     No Known Allergies     Review of Systems:  Pertinent items are noted in HPI. Objective:     Patient Vitals for the past 8 hrs:   BP Temp Pulse Resp SpO2   18 0843 125/80 97.9 °F (36.6 °C) 98 16 94 %   18 0336 122/76 97.6 °F (36.4 °C) 97 17 95 %     Temp (24hrs), Av.2 °F (36.8 °C), Min:97.6 °F (36.4 °C), Max:99.5 °F (37.5 °C)        Physical Exam:  General:     Alert and oriented. No acute distress. Chest:     Respirations unlabored. Abdomen:     Extremities:   Soft, non-tender. No evidence of cyanosis. Pulses palpable in both upper and lower extremities. Bret's sign negative in bilateral lower extremities. Moving all extremities. LUE:  Splint applied to left arm. No pain with ROM of wrist or elbow. Mild stiffness. ttp at distal ulna. Mild swelling and ecchymosis        Neurologic:  No motor deficits. Sensation stable. Neurovascular exam within normal limits. Data Review   Recent Results (from the past 24 hour(s))   CBC WITH AUTOMATED DIFF    Collection Time: 18  1:17 PM   Result Value Ref Range    WBC 7.2 4.1 - 11.1 K/uL    RBC 4.72 4.10 - 5.70 M/uL    HGB 11.7 (L) 12.1 - 17.0 g/dL    HCT 38.4 36.6 - 50.3 %    MCV 81.4 80.0 - 99.0 FL    MCH 24.8 (L) 26.0 - 34.0 PG    MCHC 30.5 30.0 - 36.5 g/dL    RDW 14.3 11.5 - 14.5 %    PLATELET 756 673 - 373 K/uL    MPV 10.9 8.9 - 12.9 FL    NRBC 0.0 0  WBC    ABSOLUTE NRBC 0.00 0.00 - 0.01 K/uL    NEUTROPHILS 72 32 - 75 %    LYMPHOCYTES 11 (L) 12 - 49 %    MONOCYTES 13 5 - 13 %    EOSINOPHILS 2 0 - 7 %    BASOPHILS 1 0 - 1 %    IMMATURE GRANULOCYTES 1 (H) 0.0 - 0.5 %    ABS. NEUTROPHILS 5.2 1.8 - 8.0 K/UL    ABS. LYMPHOCYTES 0.8 0.8 - 3.5 K/UL    ABS. MONOCYTES 0.9 0.0 - 1.0 K/UL    ABS. EOSINOPHILS 0.1 0.0 - 0.4 K/UL    ABS. BASOPHILS 0.1 0.0 - 0.1 K/UL    ABS. IMM.  GRANS. 0.1 (H) 0.00 - 0.04 K/UL    DF SMEAR SCANNED      RBC COMMENTS HYPOCHROMIA  1+        RBC COMMENTS ANISOCYTOSIS  1+       METABOLIC PANEL, COMPREHENSIVE    Collection Time: 04/07/18  1:17 PM   Result Value Ref Range    Sodium 132 (L) 136 - 145 mmol/L    Potassium 4.6 3.5 - 5.1 mmol/L    Chloride 96 (L) 97 - 108 mmol/L    CO2 27 21 - 32 mmol/L    Anion gap 9 5 - 15 mmol/L    Glucose 94 65 - 100 mg/dL    BUN 22 (H) 6 - 20 MG/DL    Creatinine 1.09 0.70 - 1.30 MG/DL    BUN/Creatinine ratio 20 12 - 20      GFR est AA >60 >60 ml/min/1.73m2    GFR est non-AA >60 >60 ml/min/1.73m2    Calcium 9.5 8.5 - 10.1 MG/DL    Bilirubin, total 0.7 0.2 - 1.0 MG/DL    ALT (SGPT) 16 12 - 78 U/L    AST (SGOT) 31 15 - 37 U/L    Alk. phosphatase 57 45 - 117 U/L    Protein, total 8.1 6.4 - 8.2 g/dL    Albumin 2.5 (L) 3.5 - 5.0 g/dL    Globulin 5.6 (H) 2.0 - 4.0 g/dL    A-G Ratio 0.4 (L) 1.1 - 2.2     TROPONIN I    Collection Time: 04/07/18  1:17 PM   Result Value Ref Range    Troponin-I, Qt. <0.04 <0.05 ng/mL   EKG, 12 LEAD, INITIAL    Collection Time: 04/07/18  3:24 PM   Result Value Ref Range    Ventricular Rate 103 BPM    Atrial Rate 103 BPM    P-R Interval 146 ms    QRS Duration 76 ms    Q-T Interval 324 ms    QTC Calculation (Bezet) 424 ms    Calculated P Axis 76 degrees    Calculated R Axis -4 degrees    Calculated T Axis 50 degrees    Diagnosis       Sinus tachycardia  When compared with ECG of 04-NOV-2017 21:07,  No significant change was found           Assessment/Plan:     69yo male with minimally displaced left distal ulnar shaft fracture    -discussed treatment options and we will proceed with conservative treatment with his current splint. We will transition him into a cast in the next week  -should follow up in office for repeat xrays in one week and we will place him in a cast at that time [facility], [phone])  -pain control  -nwb LUE, may perform elbow ROM as tolerated. Sling for comfort.         Seferino Phipps, DO

## 2018-04-08 NOTE — PROGRESS NOTES
Problem: Mobility Impaired (Adult and Pediatric)  Goal: *Acute Goals and Plan of Care (Insert Text)  Physical Therapy Goals  Initiated 4/8/2018  1. Patient will move from supine to sit and sit to supine  in bed with supervision/set-up within 7 day(s). 2.  Patient will transfer from bed to chair and chair to bed with supervision/set-up using the least restrictive device within 7 day(s). 3.  Patient will perform sit to stand with supervision/set-up within 7 day(s). 4.  Patient will ambulate with supervision/set-up for 300 feet with the least restrictive device within 7 day(s). 5.  Patient will ascend/descend 6 stairs with one handrail(s) with supervision/set-up within 7 day(s). physical Therapy EVALUATION  Patient: Verito Lopez (82 y.o. male)  Date: 4/8/2018  Primary Diagnosis: Fall        Precautions:   NWB (NWB L UE (in splint/sling))    ASSESSMENT :  Based on the objective data described below, the patient presents with impulsivity, impaired balance, decreased strength, and decreased activity tolerance limiting pt's safe functional mobility. At baseline, pt ambulates with [facility] and lives with wife, son, and daughter-in-law. Patient says his wife had a CVA about a year ago, but she is independent with ADLs and both pt and wife are home alone while son works during the day. Pt reports history of 5 falls recently, cause unknown. Pt required CGA/min assist overall to ambulate using SPC x 180 ft. Pt slightly impulsive and ambulates with shuffling gait. Pt left in bed at end of session with bed alarm. Pt will likely need HHPT and 24 hour caregiver assist vs SNF at discharge due to safety concerns from frequent falls. Will continue to follow to progress mobility. Patient will benefit from skilled intervention to address the above impairments.   Patients rehabilitation potential is considered to be Good  Factors which may influence rehabilitation potential include:   []         None noted  [] Mental ability/status  []         Medical condition  [x]         Home/family situation and support systems  [x]         Safety awareness  []         Pain tolerance/management  []         Other:      PLAN :  Recommendations and Planned Interventions:  [x]           Bed Mobility Training             []    Neuromuscular Re-Education  [x]           Transfer Training                   []    Orthotic/Prosthetic Training  [x]           Gait Training                         []    Modalities  [x]           Therapeutic Exercises           []    Edema Management/Control  [x]           Therapeutic Activities            [x]    Patient and Family Training/Education  []           Other (comment):    Frequency/Duration: Patient will be followed by physical therapy  5 times a week to address goals. Discharge Recommendations: TBD, HHPT and 24 hour caregiver assist vs SNF (if family unable to provide 24 hour assist)  Further Equipment Recommendations for Discharge: none      SUBJECTIVE:   Patient stated They don't let me get up by myself here.     OBJECTIVE DATA SUMMARY:   HISTORY:    Past Medical History:   Diagnosis Date    Diverticulitis 2017    Hypertension     [facility] [address] [facility]     Thromboembolus ([facility] [address]     15 years ago     Past Surgical History:   Procedure Laterality Date    HX APPENDECTOMY      HX COLONOSCOPY  2012     Prior Level of Function/Home Situation: lives with son and daughter-in-law and wife (though wife unable to assist due to hx of CVA), ambulates with SPC, independent with ADLs per pt, history of frequent falls at home   Personal factors and/or comorbidities impacting plan of care:     Home Situation  Home Environment: Private residence  # Steps to Enter: 6  Rails to Enter: Yes  One/Two Story Residence: Two story, live on 1st floor  Living Alone: No  Support Systems: Child(peri), Spouse/Significant Other/Partner  Patient Expects to be Discharged to[de-identified] Private residence  Current DME Used/Available at Home: Cane, straight    EXAMINATION/PRESENTATION/DECISION MAKING:   Critical Behavior:  Neurologic State: Alert  Orientation Level: Oriented X4  Cognition: Appropriate for age attention/concentration, Impulsive  Safety/Judgement: Awareness of environment, Insight into deficits  Hearing: Auditory  Auditory Impairment: None  Skin:  Splint and sling on left UE  Edema: none   Range Of Motion:  AROM: Within functional limits           PROM: Within functional limits           Strength:    Strength: Generally decreased, functional                    Tone & Sensation:   Tone: Normal              Sensation: Intact               Coordination:  Coordination: Within functional limits  Vision:      Functional Mobility:  Bed Mobility:     Supine to Sit: Stand-by assistance  Sit to Supine: Stand-by assistance     Transfers:  Sit to Stand: Minimum assistance  Stand to Sit: Contact guard assistance                       Balance:   Sitting: Intact  Standing: Impaired; With support  Standing - Static: Fair  Standing - Dynamic : Fair  Ambulation/Gait Training:  Distance (ft): 180 Feet (ft)  Assistive Device: Cane, straight;Gait belt  Ambulation - Level of Assistance: Contact guard assistance;Minimal assistance;Assist x1     Gait Description (WDL): Exceptions to WDL  Gait Abnormalities: Decreased step clearance;Shuffling gait        Base of Support: Narrowed     Speed/Amisha: Fluctuations; Shuffled  Step Length: Left shortened;Right shortened         Functional Measure:  Barthel Index:    Bathin  Bladder: 10  Bowels: 10  Groomin  Dressin  Feeding: 10  Mobility: 10  Stairs: 0  Toilet Use: 5  Transfer (Bed to Chair and Back): 10  Total: 65       Barthel and G-code impairment scale:  Percentage of impairment CH  0% CI  1-19% CJ  20-39% CK  40-59% CL  60-79% CM  80-99% CN  100%   Barthel Score 0-100 100 99-80 79-60 59-40 20-39 1-19   0   Barthel Score 0-20 20 17-19 13-16 9-12 5-8 1-4 0      The Barthel ADL Index: Guidelines  1. The index should be used as a record of what a patient does, not as a record of what a patient could do. 2. The main aim is to establish degree of independence from any help, physical or verbal, however minor and for whatever reason. 3. The need for supervision renders the patient not independent. 4. A patient's performance should be established using the best available evidence. Asking the patient, friends/relatives and nurses are the usual sources, but direct observation and common sense are also important. However direct testing is not needed. 5. Usually the patient's performance over the preceding 24-48 hours is important, but occasionally longer periods will be relevant. 6. Middle categories imply that the patient supplies over 50 per cent of the effort. 7. Use of aids to be independent is allowed. Pete Vences., Barthel, D.W. (5380). Functional evaluation: the Barthel Index. [address] [facility] (14)2. Emily Mandujano andrez JHONATAN Estrada, Shila Epperson., Torin Mercer., [place], [address] [place] [address] (1999). Measuring the change indisability after inpatient rehabilitation; comparison of the responsiveness of the Barthel Index and Functional [place] Measure. Journal of Neurology, Neurosurgery, and Psychiatry, 66(4), 855-967. Asia Orozco, N.J.A, COLT Sanchez, & Jaz Bustillo MAltafA. (2004.) Assessment of post-stroke quality of life in cost-effectiveness studies: The usefulness of the Barthel Index and the EuroQoL-5D. Quality of Life Research, 13, 137-92       G codes: In compliance with CMSs Claims Based Outcome Reporting, the following G-code set was chosen for this patient based on their primary functional limitation being treated: The outcome measure chosen to determine the severity of the functional limitation was the Barthel Index with a score of 65/100 which was correlated with the impairment scale.     ? Mobility - Walking and Moving Around:     - CURRENT STATUS: CJ - 20%-39% impaired, limited or restricted    - GOAL STATUS: CI - 1%-19% impaired, limited or restricted    - D/C STATUS:  ---------------To be determined---------------       Pain:  Pain Scale 1: Numeric (0 - 10)  Pain Intensity 1: 0              Activity Tolerance:   VSS  Please refer to the flowsheet for vital signs taken during this treatment. After treatment:   []         Patient left in no apparent distress sitting up in chair  [x]         Patient left in no apparent distress in bed  [x]         Call bell left within reach  [x]         Nursing notified  []         Caregiver present  [x]         Bed alarm activated    COMMUNICATION/EDUCATION:   The patients plan of care was discussed with: Registered Nurse. [x]         Fall prevention education was provided and the patient/caregiver indicated understanding. [x]         Patient/family have participated as able in goal setting and plan of care. [x]         Patient/family agree to work toward stated goals and plan of care. []         Patient understands intent and goals of therapy, but is neutral about his/her participation. []         Patient is unable to participate in goal setting and plan of care.     Thank you for this referral.  Cornelia Alexander, PT   Time Calculation: 22 mins

## 2018-04-08 NOTE — PROGRESS NOTES
Hospitalist Progress Note  Toño Chowdhury MD  Answering service: [phone] from in house phone  Cell: [phone]      Date of Service:  2018  NAME:  Loni Murray  :  1950  MRN:  [id number]      Admission Summary:   Patient is a 60-year-old male with a diagnosis of Parkinson's, brought in by son with the complaints of multiple falls in the last few weeks. The patient also fell this morning, hurt his wrist, and was found to have a nondisplaced left ulnar fracture. The patient said that recently he is unable to be steady on his feet. He had a couple of visits for medication adjustment with Dr. Ara Burroughs but did not help. Today, he felt much worse. His wife and the family cannot take care of him, so they decided to come to the ER. He said that his fall was accidental in nature when he was trying to sit. He just fell on the ground and he did not lose his consciousness. He has a small healing scab on the side of the head from the fall earlier this week. He also was complaining of wrist pain. A CT scan was done. No acute intracranial hemorrhage was found. He was admitted for further management.     Interval history / Subjective:     F/u multiple falls  The patient has fallen atleast 4 times in last one year  Feels slightly better  No pain except when left arm is moved     Assessment & Plan:     Left ulnar non displaced fracture  -now s/p sling  -Appreciate Ortho, outpatient follow up  -Transition to cast next week in the clinic    Parkinson's disease  -Outpatient follow up with Neurology  -Appreciate discussion with in house neurology, no change in medicines    Frequent falls  -likely sec to above  -CT head  No acute intracranial hemorrhage, mass or infarct  -PT/OT, likely would benefit from SNF    Hyponatremia  -improving with fluids    Cardiac diet    Code status: full code  DVT prophylaxis: scd  PTA: home    Plan: rehab ?tomorrow    Care Plan discussed with: Patient/Family  Disposition: TBD     Hospital Problems  Date Reviewed: 4/7/2018          Codes Class Noted POA    Fall ICD-10-CM: W19. Ruth Jade  ICD-9-CM: E888.9  4/7/2018 Unknown        * (Principal)Parkinson disease [facility]) ICD-10-CM: G20  ICD-9-CM: 332.0  12/13/2017 Yes                Review of Systems:   A comprehensive review of systems was negative except for that written in the HPI. Vital Signs:    Last 24hrs VS reviewed since prior progress note. Most recent are:  Visit Vitals    /80    Pulse 98    Temp 97.9 °F (36.6 °C)    Resp 16    Ht 5' 10\" (1.778 m)    Wt 86.2 kg (190 lb)    SpO2 94%    BMI 27.26 kg/m2         Intake/Output Summary (Last 24 hours) at 04/08/18 1214  Last data filed at 04/08/18 0403   Gross per 24 hour   Intake              631 ml   Output                0 ml   Net              631 ml        Physical Examination:             Constitutional:  No acute distress, cooperative, pleasant    ENT:  Oral mucous moist, oropharynx benign. Neck supple,    Resp:  CTA bilaterally. No wheezing/rhonchi/rales. No accessory muscle use   CV:  Regular rhythm, normal rate, no murmurs, gallops, rubs    GI:  Soft, non distended, non tender. normoactive bowel sounds, no hepatosplenomegaly     Musculoskeletal:  No edema, warm, 2+ pulses throughout    Neurologic:  Moves all extremities. AAOx3, CN II-XII reviewed            Data Review:    Review and/or order of clinical lab test      Labs:     Recent Labs      04/07/18   1317   WBC  7.2   HGB  11.7*   HCT  38.4   PLT  239     Recent Labs      04/07/18   1317   NA  132*   K  4.6   CL  96*   CO2  27   BUN  22*   CREA  1.09   GLU  94   CA  9.5     Recent Labs      04/07/18   1317   SGOT  31   ALT  16   AP  57   TBILI  0.7   TP  8.1   ALB  2.5*   GLOB  5.6*     No results for input(s): INR, PTP, APTT in the last 72 hours.     No lab exists for component: INREXT   No results for input(s): FE, TIBC, PSAT, FERR in the last 72 hours. No results found for: FOL, RBCF   No results for input(s): PH, PCO2, PO2 in the last 72 hours. Recent Labs      04/07/18   1317   TROIQ  <0.04     Lab Results   Component Value Date/Time    Cholesterol, total 103 11/01/2017 11:51 AM    HDL Cholesterol 11 (L) 11/01/2017 11:51 AM    LDL, calculated 38 11/01/2017 11:51 AM    Triglyceride 269 (H) 11/01/2017 11:51 AM     No results found for: [facility]  Lab Results   Component Value Date/Time    Color YELLOW/STRAW 11/04/2017 09:06 PM    Appearance CLEAR 11/04/2017 09:06 PM    Specific gravity 1.016 11/04/2017 09:06 PM    pH (UA) 5.0 11/04/2017 09:06 PM    Protein NEGATIVE  11/04/2017 09:06 PM    Glucose NEGATIVE  11/04/2017 09:06 PM    Ketone NEGATIVE  11/04/2017 09:06 PM    Bilirubin NEGATIVE  11/04/2017 09:06 PM    Urobilinogen 0.2 11/04/2017 09:06 PM    Nitrites NEGATIVE  11/04/2017 09:06 PM    Leukocyte Esterase NEGATIVE  11/04/2017 09:06 PM    Epithelial cells FEW 11/04/2017 09:06 PM    Bacteria NEGATIVE  11/04/2017 09:06 PM    WBC 0-4 11/04/2017 09:06 PM    RBC 0-5 11/04/2017 09:06 PM         Medications Reviewed:     Current Facility-Administered Medications   Medication Dose Route Frequency    carbidopa-levodopa (SINEMET)  mg per tablet 1 Tab  1 Tab Oral TID    pantoprazole (PROTONIX) tablet 40 mg  40 mg Oral DAILY    . PHARMACY TO SUBSTITUTE PER PROTOCOL    Per Protocol    sodium chloride (NS) flush 5-10 mL  5-10 mL IntraVENous Q8H    sodium chloride (NS) flush 5-10 mL  5-10 mL IntraVENous PRN    acetaminophen (TYLENOL) tablet 650 mg  650 mg Oral Q4H PRN     ______________________________________________________________________  EXPECTED LENGTH OF STAY: - - -  ACTUAL LENGTH OF STAY:          1                 Tiana Woodward MD

## 2018-04-09 NOTE — PROGRESS NOTES
Bedside and Verbal shift change report given to SANTO Melchor (oncoming nurse) by Isabell Fletcher RN (offgoing nurse). Report included the following information SBAR, Kardex, Intake/Output, MAR, Accordion and Recent Results.

## 2018-04-09 NOTE — PROGRESS NOTES
Bedside shift change report given to [facility] (oncoming nurse) by Petra Clark (offgoing nurse). Report included the following information SBAR, Kardex, ED Summary, Intake/Output, MAR and Recent Results.

## 2018-04-09 NOTE — PROGRESS NOTES
Bedside and Verbal shift change report given to Chandana Gautam, [facility] (oncoming nurse) by Bryson Hazel RN (offgoing nurse). Report included the following information SBAR, Kardex, Procedure Summary, Intake/Output, MAR and Recent Results.

## 2018-04-09 NOTE — PROGRESS NOTES
[facility] has accepted the patient for SNF Rehab and will start the insurance authorization. [facility] did not accept.  CRM will update the son in the am. Lindsey Morales

## 2018-04-09 NOTE — PROGRESS NOTES
Care Management Interventions  PCP Verified by CM: Yes (Dr. Bronwyn Paulino)  Palliative Care Criteria Met (RRAT>21 & CHF Dx)?: No  Mode of Transport at Discharge: Self (family/car)  Transition of Care Consult (CM Consult): SNF (referral to [facility] and [facility])  Partner SNF: No  Reason Why Partner SNF Not Chosen: Location  MyChart Signup: No  Discharge Durable Medical Equipment: No  Health Maintenance Reviewed: Yes  Physical Therapy Consult: Yes  Occupational Therapy Consult: Yes  Speech Therapy Consult: No  Current Support Network: Lives with Spouse, Relative's Home (lives with son and his family)  Confirm Follow Up Transport: Family  Plan discussed with Pt/Family/Caregiver: Yes  Freedom of Choice Offered: Yes (NA)  [address] Provided?: No  Discharge Location  Discharge Placement: Skilled nursing facility    Reason for Admission:      Falls/wrist ulnar fracture  RRAT Score:      10  Plan for utilizing home health:       SNF , referrals to [facility] and [facility]  Likelihood of Readmission:      Low if going to SNF     Son: Bernardo Chauhan [phone], for discharge planning.  LAURIET

## 2018-04-09 NOTE — PROGRESS NOTES
Problem: Mobility Impaired (Adult and Pediatric)  Goal: *Acute Goals and Plan of Care (Insert Text)  Physical Therapy Goals  Initiated 4/8/2018  1. Patient will move from supine to sit and sit to supine  in bed with supervision/set-up within 7 day(s). 2.  Patient will transfer from bed to chair and chair to bed with supervision/set-up using the least restrictive device within 7 day(s). 3.  Patient will perform sit to stand with supervision/set-up within 7 day(s). 4.  Patient will ambulate with supervision/set-up for 300 feet with the least restrictive device within 7 day(s). 5.  Patient will ascend/descend 6 stairs with one handrail(s) with supervision/set-up within 7 day(s). physical Therapy TREATMENT  Patient: Celine Owusu (76 y.o. male)  Date: 4/9/2018  Diagnosis: Fall Parkinson disease [facility])  Precautions: Fall, NWB (NWB L UE (in sling))  Chart, physical therapy assessment, plan of care and goals were reviewed. ASSESSMENT:  Patient received sitting in chair, agreeable to therapy, with L UE sling donned. Patient required additional time and min A to steady upon standing from chair. Patient's gait characterized by shuffling, mild path deviations, and overall general unsteadiness throughout ambulation. Min A to steady during gait. Stated that gait had fatigued him and he politely deferred further exercises despite therapist's offer for seated rest break for recovery. Patient will benefit from SNF rehab at d/c, as he is alone with his wife during the day and she is unable to physically assist him and he needs hands on assist to steady at this time. The need for L UE sling also negatively impacts his balance at this time, and prevents him from potentially trying a walker to further improve his balance.    Progression toward goals:  []    Improving appropriately and progressing toward goals  [x]    Improving slowly and progressing toward goals  []    Not making progress toward goals and plan of care will be adjusted     PLAN:  Patient continues to benefit from skilled intervention to address the above impairments. Continue treatment per established plan of care. Discharge Recommendations:  Skilled Nursing Facility  Further Equipment Recommendations for Discharge:  TBD at SNF     SUBJECTIVE:   Patient stated That wore me out pretty good.  re: ambulation and then deferred exercises    OBJECTIVE DATA SUMMARY:   Critical Behavior:  Neurologic State: Alert, Appropriate for age  Orientation Level: Disoriented to time, Oriented to person, Oriented to place, Oriented to situation  Cognition: Appropriate decision making, Memory loss, Follows commands  Safety/Judgement: Awareness of environment, Insight into deficits  Functional Mobility Training:  Transfers:  Sit to Stand: Contact guard assistance;Minimum assistance (min A to steady)  Stand to Sit: Minimum assistance  Balance:  Sitting: Intact  Standing: Impaired; With support  Standing - Static: Fair  Standing - Dynamic : Fair  Ambulation/Gait Training:  Distance (ft): 130 Feet (ft)  Assistive Device: Gait belt;Cane, straight  Ambulation - Level of Assistance: Minimal assistance  Gait Abnormalities: Decreased step clearance;Shuffling gait  Base of Support: Narrowed  Speed/Amisha: Shuffled; Slow  Step Length: Right shortened;Left shortened  Pain:  Pain Scale 1: Numeric (0 - 10)  Pain Intensity 1: 0  Activity Tolerance:   Fatigued quickly  Please refer to the flowsheet for vital signs taken during this treatment.   After treatment:   [x]    Patient left in no apparent distress sitting up in chair  []    Patient left in no apparent distress in bed  [x]    Call bell left within reach  [x]    Nursing notified  []    Caregiver present  []    Bed alarm activated    COMMUNICATION/COLLABORATION:   The patients plan of care was discussed with: Registered Nurse, CHRISTINA, ROBERTO Roberts PT, DPT   Time Calculation: 13 mins

## 2018-04-09 NOTE — PROGRESS NOTES
Problem: Self Care Deficits Care Plan (Adult)  Goal: *Acute Goals and Plan of Care (Insert Text)  Occupational Therapy Goals  Initiated 4/9/2018     1. Patient will don/doff arm sling at supervision/set-up level within 7 days. 2.  Patient will perform L shoulder/elbow exercises per physician order with supervision/set-up within 7 days. 3.  Patient will perform upper body ADLs with supervision/set-up  within 7 days. 4. Patient will perform lower body dressing with supervision/set-up within 7 days. 5.  Patient will perform toilet transfers with independence using  [address] within 7 days. 6.  Patient will verbalize/demonstrate wrist precautions (NWB) during ADLs with 100% accuracy within 7 days. Occupational Therapy EVALUATION  Patient: Jeevan Garcia (65 y.o. male)  Date: 4/9/2018  Primary Diagnosis: Fall        Precautions:  Fall, NWB (NWB L UE (in sling))    ASSESSMENT :  Based on the objective data described below, the patient presents with overall CGA-Min A x1 with SPC for functional mobility, up to Max A for lower body ADLs, and supervision-Mod A for upper body ADLs s/p GLF with distal ulnar fracture. Per ortho, patient currently in ace wrap splint with sling and f/u within 1 week for casting with [employer]. Patient with baseline Parkinson's, typically Mod I with SPC for mobility but recently experiencing >4 GLFs. Patient demonstrating multiple mild LOBs with use of SPC and functional transfers. Decreased insight into deficits, short term memory loss, decreased awareness of environment/situation, and LUE limitations (NWB) currently limiting his ability to safely complete all ADLs. Patient will require SNF rehab when medically stable as he does not currently have 24/7 supervision/assist and is impaired in >55% of all ADLs at this time. Will continue to follow. Patient will benefit from skilled intervention to address the above impairments.   Patients rehabilitation potential is considered to be Fair  Factors which may influence rehabilitation potential include:   []             None noted  [x]             Mental ability/status  []             Medical condition  []             Home/family situation and support systems  []             Safety awareness  []             Pain tolerance/management  []             Other:      PLAN :  Recommendations and Planned Interventions:  [x]               Self Care Training                  [x]        Therapeutic Activities  [x]               Functional Mobility Training    [x]        Cognitive Retraining  [x]               Therapeutic Exercises           [x]        Endurance Activities  [x]               Balance Training                   [x]        Neuromuscular Re-Education  []               Visual/Perceptual Training     [x]   Home Safety Training  [x]               Patient Education                 [x]        Family Training/Education  []               Other (comment):    Frequency/Duration: Patient will be followed by occupational therapy 5 times a week to address goals. Discharge Recommendations: Rehab  Further Equipment Recommendations for Discharge: TBD by rehab     SUBJECTIVE:   Patient stated I think I've just been up in the chair for a couple of minutes.  Patient had sat up for lunch, ambulated with PT, and returned to chair during the course of the afternoon. OBJECTIVE DATA SUMMARY:   HISTORY:   Past Medical History:   Diagnosis Date    Diverticulitis 2017    Hypertension     Parkinson disease ([facility]     Thromboembolus ([facility]     15 years ago     Past Surgical History:   Procedure Laterality Date    HX APPENDECTOMY      HX COLONOSCOPY  2012       Prior Level of Function/Environment/Context: Per patient report and discussion with CM, patient lives at home with son, son's wife, 4 grandchildren, and patient's wife (who had a CVA 4 years ago and is overall Mod I for personal ADLs but is limited with ability to assist patient).  Patient typically ambulatory with SPC and Mod I for ADLs but steadily having increase in falls (4 over last few weeks). Recent diagnosis of Parkinson's. Home Situation  Home Environment: Private residence  # Steps to Enter: 6  Rails to Enter: Yes  One/Two Story Residence: Two story, live on 1st floor  Living Alone: No  Support Systems: Child(peri), Spouse/Significant Other/Partner  Patient Expects to be Discharged to[de-identified] Private residence  Current DME Used/Available at Home: Cane, straight  []  Right hand dominant   []  Left hand dominant    EXAMINATION OF PERFORMANCE DEFICITS:  Cognitive/Behavioral Status:  Neurologic State: Alert;Confused  Orientation Level: Oriented to person;Oriented to place  Cognition: Follows commands;Decreased attention/concentration;Poor safety awareness;Memory loss     Perseveration: No perseveration noted  Safety/Judgement: Fall prevention    Skin: Appears intact    Edema: None noted in BUEs    Hearing: Auditory  Auditory Impairment: None    Vision/Perceptual:    Tracking: Able to track stimulus in all quadrants w/o difficulty                      Acuity: Within Defined Limits         Range of Motion:  In BUEs  AROM: Within functional limits (Excluding L elbow/wrist)  PROM: Within functional limits                      Strength: In BUEs  Strength: Generally decreased, functional (Primarily with LUE d/t fracture and NWB status)                Coordination:  Coordination: Within functional limits  Fine Motor Skills-Upper: Left Impaired;Right Intact    Gross Motor Skills-Upper: Left Impaired;Right Intact    Tone & Sensation:  In BUEs  Tone: Normal  Sensation: Intact                      Balance:  Sitting: Intact  Standing: Impaired; With support  Standing - Static: Fair  Standing - Dynamic : Fair    Functional Mobility and Transfers for ADLs:  Bed Mobility:   Sit>supine:  Mod I    Transfers:  Sit to Stand: Contact guard assistance;Minimum assistance (min A to steady)  Stand to Sit: Minimum assistance  Toilet Transfer : Minimum assistance;Assist x1;Adaptive equipment (Grab bar, SPC, raised toilet seat, and additional time)    ADL Assessment:  Feeding: Minimum assistance (Inferred primarily for cutting food and cognition)    Oral Facial Hygiene/Grooming: Minimum assistance (Inferred primarily for cognition and LUE deficits)    Bathing: Moderate assistance (Inferred for impaired balance & fxnl deficits with LUE)    Upper Body Dressing: Moderate assistance (Inferred for comp. dress. techn. and sling mngt)    Lower Body Dressing: Moderate assistance (Inferred for impaired balance & fxnl deficits with LUE)    Toileting: Supervision    ADL Intervention and task modifications:  - D/t cognitive deficits and STM loss/AMS, patient unable to fully participate in ADL education for upper and lower body tasks. Attempted to initiate sling management teaching, patient unable to demonstrate comprehension. Aborted until family present for reinforcement. Upper Body Dressing Assistance  Orthotics(Brace): Total assistance (dependent) (Impaired ability to comprehend d/t cognitive deficits)    Toileting  Bladder Hygiene: Independent  Clothing Management: Moderate assistance  Cues: Physical assistance for pants up;Verbal cues provided    Cognitive Retraining  Safety/Judgement: Fall prevention    Therapeutic Exercise:  - Patient cleared for shoulder and elbow ROM exercises per MD. Patient mildly perseverating on returning to bed and unable to purposefully attend to education without family present for safe reinforcement as patient is confused and potentially will attempt inappropriately and cause further orthopedic injury.      Functional Measure:  Barthel Index:    Bathin  Bladder: 10  Bowels: 10  Groomin  Dressin  Feedin  Mobility: 10  Stairs: 0  Toilet Use: 5  Transfer (Bed to Chair and Back): 10  Total: 55       Barthel and G-code impairment scale:  Percentage of impairment CH  0% CI  1-19% CJ  20-39% CK  40-59% CL  60-79% CM  80-99% CN  100%   Barthel Score 0-100 100 99-80 79-60 59-40 20-39 1-19   0   Barthel Score 0-20 20 17-19 13-16 9-12 5-8 1-4 0      The Barthel ADL Index: Guidelines  1. The index should be used as a record of what a patient does, not as a record of what a patient could do. 2. The main aim is to establish degree of independence from any help, physical or verbal, however minor and for whatever reason. 3. The need for supervision renders the patient not independent. 4. A patient's performance should be established using the best available evidence. Asking the patient, friends/relatives and nurses are the usual sources, but direct observation and common sense are also important. However direct testing is not needed. 5. Usually the patient's performance over the preceding 24-48 hours is important, but occasionally longer periods will be relevant. 6. Middle categories imply that the patient supplies over 50 per cent of the effort. 7. Use of aids to be independent is allowed. Maged Florian., Barthel, DAltafW. (9929). Functional evaluation: the Barthel Index. [facility] (14)2. Zion Jarquin andrez JHONATAN Estrada, Corwin Galvan., Juanjo Ambriz., Jluis, [address] [place] [address] Ave (1999). Measuring the change indisability after inpatient rehabilitation; comparison of the responsiveness of the Barthel Index and Functional [place] Measure. Journal of Neurology, Neurosurgery, and Psychiatry, 66(4), 221-957. Neri Sam, N.J.A, COLT Sanchez, & Denise Mantilla MJAMES. (2004.) Assessment of post-stroke quality of life in cost-effectiveness studies: The usefulness of the Barthel Index and the EuroQoL-5D. Quality of Life Research, 13, 501-00         G codes: In compliance with CMSs Claims Based Outcome Reporting, the following G-code set was chosen for this patient based on their primary functional limitation being treated:     The outcome measure chosen to determine the severity of the functional limitation was the Barthel Index with a score of 55/100 which was correlated with the impairment scale. ? Self Care:     - CURRENT STATUS: CK - 40%-59% impaired, limited or restricted    - GOAL STATUS: CJ - 20%-39% impaired, limited or restricted    - D/C STATUS:  ---------------To be determined---------------     Occupational Therapy Evaluation Charge Determination   History Examination Decision-Making   LOW Complexity : Brief history review  LOW Complexity : 1-3 performance deficits relating to physical, cognitive , or psychosocial skils that result in activity limitations and / or participation restrictions  MEDIUM Complexity : Patient may present with comorbidities that affect occupational performnce. Miniml to moderate modification of tasks or assistance (eg, physical or verbal ) with assesment(s) is necessary to enable patient to complete evaluation       Based on the above components, the patient evaluation is determined to be of the following complexity level: LOW   Pain:  Pain Scale 1: Numeric (0 - 10)  Pain Intensity 1: 0              Activity Tolerance:   Fair. VSS. Limited by confusion. Please refer to the flowsheet for vital signs taken during this treatment. After treatment:   [] Patient left in no apparent distress sitting up in chair  [x] Patient left in no apparent distress in bed  [x] Call bell left within reach  [x] Nursing notified  [] Caregiver present  [x] Bed alarm activated    COMMUNICATION/EDUCATION:   The patients plan of care was discussed with: Physical Therapist, Registered Nurse and . [] Home safety education was provided and the patient/caregiver indicated understanding. [x] Patient/family have participated as able in goal setting and plan of care. [] Patient/family agree to work toward stated goals and plan of care. [] Patient understands intent and goals of therapy, but is neutral about his/her participation. [] Patient is unable to participate in goal setting and plan of care.   This patients plan of care is appropriate for delegation to IVELISSE.     Thank you for this referral.  Darwin Boothe, OT  Time Calculation: 15 mins

## 2018-04-09 NOTE — PROGRESS NOTES
Hospital PCP SCOTT from SNF follow-up appointment with Dr. Jeanie York on Tuesday May 1, 2018 @ 11:20 a.m.  Added to AVS.   Jasmeet Carpenter CM Specialist

## 2018-04-09 NOTE — PROGRESS NOTES
Bedside and Verbal shift change report given to Claudette Jensen, RN (oncoming nurse) by Mitchell Rodríguez RN (offgoing nurse). Report included the following information SBAR.

## 2018-04-09 NOTE — PROGRESS NOTES
Hospitalist Progress Note  Keila Santos MD  Answering service: [phone] OR [phone] from in house phone  Cell: [phone]      Date of Service:  2018  NAME:  Jacqueline David  :  1950  MRN:  [id number]      Admission Summary:   Patient is a 72-year-old male with a diagnosis of Parkinson's, brought in by son with the complaints of multiple falls in the last few weeks. The patient also fell this morning, hurt his wrist, and was found to have a nondisplaced left ulnar fracture. The patient said that recently he is unable to be steady on his feet. He had a couple of visits for medication adjustment with Dr. Didier Sin but did not help. Today, he felt much worse. His wife and the family cannot take care of him, so they decided to come to the ER. He said that his fall was accidental in nature when he was trying to sit. He just fell on the ground and he did not lose his consciousness. He has a small healing scab on the side of the head from the fall earlier this week. He also was complaining of wrist pain. A CT scan was done. No acute intracranial hemorrhage was found. He was admitted for further management.     Interval history / Subjective:     F/u multiple falls  The patient has fallen atleast 4 times in last one year  Feels slightly better  No pain except when left arm is moved     Assessment & Plan:     Left ulnar non displaced fracture  -now s/p sling  -Appreciate Ortho, outpatient follow up  -Transition to cast next week in the clinic    Parkinson's disease  -Outpatient follow up with Neurology  -Appreciate discussion with in house neurology, no change in medicines    Frequent falls  -likely sec to above  -CT head  No acute intracranial hemorrhage, mass or infarct  -PT/OT, likely would benefit from SNF    Hyponatremia  -improving with fluids    Cardiac diet    PT/OT rehab    Code status: full code  DVT prophylaxis: scd  PTA: home    Plan: rehab ?tomorrow    Care Plan discussed with: Patient/Family  Disposition: TBD     Hospital Problems  Date Reviewed: 4/7/2018          Codes Class Noted POA    Fall ICD-10-CM: W19. Nikko Yañez  ICD-9-CM: E888.9  4/7/2018 Unknown        * (Principal)Parkinson disease [facility]) ICD-10-CM: G20  ICD-9-CM: 332.0  12/13/2017 Yes                Review of Systems:   A comprehensive review of systems was negative except for that written in the HPI. Vital Signs:    Last 24hrs VS reviewed since prior progress note. Most recent are:  Visit Vitals    /75 (BP 1 Location: Right arm, BP Patient Position: At rest)    Pulse 100    Temp 98.1 °F (36.7 °C)    Resp 19    Ht 5' 10\" (1.778 m)    Wt 86.2 kg (190 lb)    SpO2 93%    BMI 27.26 kg/m2         Intake/Output Summary (Last 24 hours) at 04/09/18 1453  Last data filed at 04/09/18 1301   Gross per 24 hour   Intake              990 ml   Output                0 ml   Net              990 ml        Physical Examination:             Constitutional:  No acute distress, cooperative, pleasant    ENT:  Oral mucous moist, oropharynx benign. Neck supple,    Resp:  CTA bilaterally. No wheezing/rhonchi/rales. No accessory muscle use   CV:  Regular rhythm, normal rate, no murmurs, gallops, rubs    GI:  Soft, non distended, non tender. normoactive bowel sounds, no hepatosplenomegaly     Musculoskeletal:  No edema, warm, 2+ pulses throughout    Neurologic:  Moves all extremities. AAOx3, CN II-XII reviewed            Data Review:    Review and/or order of clinical lab test      Labs:     Recent Labs      04/07/18   1317   WBC  7.2   HGB  11.7*   HCT  38.4   PLT  239     Recent Labs      04/07/18   1317   NA  132*   K  4.6   CL  96*   CO2  27   BUN  22*   CREA  1.09   GLU  94   CA  9.5     Recent Labs      04/07/18   1317   SGOT  31   ALT  16   AP  57   TBILI  0.7   TP  8.1   ALB  2.5*   GLOB  5.6*     No results for input(s): INR, PTP, APTT in the last 72 hours.     No lab exists for component: INREXT, INREXT   No results for input(s): FE, TIBC, PSAT, FERR in the last 72 hours. No results found for: FOL, RBCF   No results for input(s): PH, PCO2, PO2 in the last 72 hours.   Recent Labs      04/07/18   1317   TROIQ  <0.04     Lab Results   Component Value Date/Time    Cholesterol, total 103 11/01/2017 11:51 AM    HDL Cholesterol 11 (L) 11/01/2017 11:51 AM    LDL, calculated 38 11/01/2017 11:51 AM    Triglyceride 269 (H) 11/01/2017 11:51 AM     No results found for: [facility]  Lab Results   Component Value Date/Time    Color YELLOW/STRAW 11/04/2017 09:06 PM    Appearance CLEAR 11/04/2017 09:06 PM    Specific gravity 1.016 11/04/2017 09:06 PM    pH (UA) 5.0 11/04/2017 09:06 PM    Protein NEGATIVE  11/04/2017 09:06 PM    Glucose NEGATIVE  11/04/2017 09:06 PM    Ketone NEGATIVE  11/04/2017 09:06 PM    Bilirubin NEGATIVE  11/04/2017 09:06 PM    Urobilinogen 0.2 11/04/2017 09:06 PM    Nitrites NEGATIVE  11/04/2017 09:06 PM    Leukocyte Esterase NEGATIVE  11/04/2017 09:06 PM    Epithelial cells FEW 11/04/2017 09:06 PM    Bacteria NEGATIVE  11/04/2017 09:06 PM    WBC 0-4 11/04/2017 09:06 PM    RBC 0-5 11/04/2017 09:06 PM         Medications Reviewed:     Current Facility-Administered Medications   Medication Dose Route Frequency    carbidopa-levodopa (SINEMET)  mg per tablet 1 Tab  1 Tab Oral TID    pantoprazole (PROTONIX) tablet 40 mg  40 mg Oral DAILY    safinamide (Xadago) tab 100 mg  (Patient Supplied)  100 mg Oral DAILY    sodium chloride (NS) flush 5-10 mL  5-10 mL IntraVENous Q8H    sodium chloride (NS) flush 5-10 mL  5-10 mL IntraVENous PRN    acetaminophen (TYLENOL) tablet 650 mg  650 mg Oral Q4H PRN     ______________________________________________________________________  EXPECTED LENGTH OF STAY: - - -  ACTUAL LENGTH OF STAY:          2                 Marina Lyons MD

## 2018-04-10 NOTE — ED PROVIDER NOTES
Patient is a 80 y.o. male presenting with fall and wrist pain. Fall     Wrist Pain             75y M with hx of Parkinsons here s/p fall. Landed on his L arm during a mechanical fall. Now has pain in the distal forearm. No LOC. Had a fall a few days ago in which he hit the L side of his head but no LOC. No other complaints today. No chest pain. No trouble breathing. No nausea or vomiting. In general feels unsteady on his feet. His son is with him and reports increasing falls in the past few weeks. Pt is living with son and daughter-in-law but son feels he does not have the resources to take care of him and feels that it is an unsafe situation. They are already caring for pt's wife at home as well who sustained a severe stroke and requires almost constant care. Son reports pt is no longer able to complete his ADL's at all due to gait instability. Son also reports decreased appetite over the past few days. Not eating or drinking as well as normal. He is not sure if this is causing some dizziness on top of everything else that is making the gait worse. Past Medical History:   Diagnosis Date    Diverticulitis 2017    Hypertension     [facility] [address]     Thromboembolus ([facility] [address]     15 years ago       Past Surgical History:   Procedure Laterality Date    HX APPENDECTOMY      HX COLONOSCOPY  2012         Family History:   Problem Relation Age of Onset    Cancer Mother      bone    Diabetes Father     Heart Disease Father        Social History     Social History    Marital status:      Spouse name: N/A    Number of children: N/A    Years of education: N/A     Occupational History    Not on file.      Social History Main Topics    Smoking status: Never Smoker    Smokeless tobacco: Never Used    Alcohol use Yes      Comment: occasional    Drug use: No    Sexual activity: Yes     Partners: Female     Other Topics Concern    Not on file     Social History Narrative         ALLERGIES: Review of patient's allergies indicates no known allergies. Review of Systems   Review of Systems   Constitutional: (-) weight loss. HEENT: (-) stiff neck   Eyes: (-) discharge. Respiratory: (-) for cough. Cardiovascular: (-) syncope. Gastrointestinal: (-) blood in stool. Genitourinary: (-) hematuria. Musculoskeletal: (-) myalgias. Neurological: (-) seizure. Skin: (-) petechiae  Lymph/Immunologic: (-) enlarged lymph nodes  All other systems reviewed and are negative. Vitals:    04/08/18 2145 04/09/18 0309 04/09/18 0827 04/09/18 1446   BP: 131/73 129/78 124/82 118/75   Pulse: 98 (!) 104 (!) 104 100   Resp: 16 17 16 19   Temp: 98.2 °F (36.8 °C) 97.7 °F (36.5 °C) 98 °F (36.7 °C) 98.1 °F (36.7 °C)   SpO2: 94% 97% 98% 93%   Weight:       Height:                Physical Exam Nursing note and vitals reviewed. Constitutional: oriented to person, place, and time. appears well-developed and well-nourished. No distress. Head: Normocephalic and atraumatic. Sclera anicteric  Nose: No rhinorrhea  Mouth/Throat: Oropharynx is clear and dry. Pharynx normal  Eyes: Conjunctivae are normal. Pupils are equal, round, and reactive to light. Right eye exhibits no discharge. Left eye exhibits no discharge. Neck: Painless normal range of motion. Neck supple. No LAD. Cardiovascular: Normal rate, regular rhythm, normal heart sounds and intact distal pulses. Exam reveals no gallop and no friction rub. No murmur heard. Pulmonary/Chest:  No respiratory distress. No wheezes. No rales. No rhonchi. No increased work of breathing. No accessory muscle use. Good air exchange throughout. Abdominal: soft, non-tender, no rebound or guarding. No hepatosplenomegaly. Normal bowel sounds throughout. Back: no tenderness to palpation, no deformities, no CVA tenderness  Extremities/Musculoskeletal: Normal range of motion. (+) tenderness and some crepitance to the L medial distal forearm with swelling. No obvious deformity.  Distal extremities are neurovasc intact. Lymphadenopathy:   No adenopathy. Neurological:  Alert and oriented to person, place, and time. Coordination normal. CN 2-12 intact. Motor and sensory function intact. Skin: Skin is warm and dry. No rash noted. No pallor. MDM 75y M here with L ulnar fracture from a fall. Splint per ortho - it is non-operative. Also with dehydration. Son is not comfortable taking him back home as he doesn't have resources for him and not able to obtain at this time of day or week. Will admit for hydration and further assessment.       ED Course       Procedures

## 2018-04-10 NOTE — PROGRESS NOTES
Hospitalist Progress Note  Jose Miguel Hillman MD  Answering service: [phone] OR [phone] from in house phone  Cell: [phone]      Date of Service:  4/10/2018  NAME:  Emely Montano  :  1950  MRN:  [id number]      Admission Summary:   Patient is a 81-year-old male with a diagnosis of Parkinson's, brought in by son with the complaints of multiple falls in the last few weeks. The patient also fell this morning, hurt his wrist, and was found to have a nondisplaced left ulnar fracture. The patient said that recently he is unable to be steady on his feet. He had a couple of visits for medication adjustment with Dr. Jannette Cuevas but did not help. Today, he felt much worse. His wife and the family cannot take care of him, so they decided to come to the ER. He said that his fall was accidental in nature when he was trying to sit. He just fell on the ground and he did not lose his consciousness. He has a small healing scab on the side of the head from the fall earlier this week. He also was complaining of wrist pain. A CT scan was done. No acute intracranial hemorrhage was found. He was admitted for further management.     Interval history / Subjective:     F/u multiple falls  The patient has fallen atleast 4 times in last one year  Feels slightly better  No pain     Assessment & Plan:     Left ulnar non displaced fracture  -now s/p sling  -Appreciate Ortho, outpatient follow up  -Transition to cast next week in the clinic    Parkinson's disease  -Outpatient follow up with Neurology  -Appreciate discussion with in house neurology, no change in medicines    Frequent falls  -likely sec to above  -CT head  No acute intracranial hemorrhage, mass or infarct  -PT/OT, likely would benefit from SNF    Hyponatremia  -improving with fluids    Cardiac diet    PT/OT rehab    Code status: full code  DVT prophylaxis: scd  PTA: home    Plan: rehab ?today  Ila Fernandez Jessi) awaiting insurance Andreheather Red discussed with: Patient/Family  Disposition: TBD     Hospital Problems  Date Reviewed: 4/7/2018          Codes Class Noted POA    Fall ICD-10-CM: W19. Christi Sam  ICD-9-CM: E888.9  4/7/2018 Unknown        * (Principal)Parkinson disease [facility]) ICD-10-CM: G20  ICD-9-CM: 332.0  12/13/2017 Yes                Review of Systems:   A comprehensive review of systems was negative except for that written in the HPI. Vital Signs:    Last 24hrs VS reviewed since prior progress note. Most recent are:  Visit Vitals    /73 (BP 1 Location: Right arm, BP Patient Position: At rest)    Pulse 100    Temp 98.1 °F (36.7 °C)    Resp 16    Ht 5' 10\" (1.778 m)    Wt 86.2 kg (190 lb)    SpO2 94%    BMI 27.26 kg/m2         Intake/Output Summary (Last 24 hours) at 04/10/18 0816  Last data filed at 04/10/18 0415   Gross per 24 hour   Intake             1640 ml   Output                0 ml   Net             1640 ml        Physical Examination:             Constitutional:  No acute distress, cooperative, pleasant    ENT:  Oral mucous moist, oropharynx benign. Neck supple,    Resp:  CTA bilaterally. No wheezing/rhonchi/rales. No accessory muscle use   CV:  Regular rhythm, normal rate, no murmurs, gallops, rubs    GI:  Soft, non distended, non tender. normoactive bowel sounds, no hepatosplenomegaly     Musculoskeletal:  No edema, warm, 2+ pulses throughout    Neurologic:  Moves all extremities. AAOx3, CN II-XII reviewed            Data Review:    Review and/or order of clinical lab test      Labs:     Recent Labs      04/07/18   1317   WBC  7.2   HGB  11.7*   HCT  38.4   PLT  239     Recent Labs      04/07/18   1317   NA  132*   K  4.6   CL  96*   CO2  27   BUN  22*   CREA  1.09   GLU  94   CA  9.5     Recent Labs      04/07/18   1317   SGOT  31   ALT  16   AP  57   TBILI  0.7   TP  8.1   ALB  2.5*   GLOB  5.6*     No results for input(s): INR, PTP, APTT in the last 72 hours.     No lab exists for component: INREXT, INREXT   No results for input(s): FE, TIBC, PSAT, FERR in the last 72 hours. No results found for: FOL, RBCF   No results for input(s): PH, PCO2, PO2 in the last 72 hours.   Recent Labs      04/07/18   1317   TROIQ  <0.04     Lab Results   Component Value Date/Time    Cholesterol, total 103 11/01/2017 11:51 AM    HDL Cholesterol 11 (L) 11/01/2017 11:51 AM    LDL, calculated 38 11/01/2017 11:51 AM    Triglyceride 269 (H) 11/01/2017 11:51 AM     Lab Results   Component Value Date/Time    Glucose (POC) 85 04/09/2018 10:00 PM    Glucose (POC) 78 04/09/2018 09:45 PM     Lab Results   Component Value Date/Time    Color YELLOW/STRAW 11/04/2017 09:06 PM    Appearance CLEAR 11/04/2017 09:06 PM    Specific gravity 1.016 11/04/2017 09:06 PM    pH (UA) 5.0 11/04/2017 09:06 PM    Protein NEGATIVE  11/04/2017 09:06 PM    Glucose NEGATIVE  11/04/2017 09:06 PM    Ketone NEGATIVE  11/04/2017 09:06 PM    Bilirubin NEGATIVE  11/04/2017 09:06 PM    Urobilinogen 0.2 11/04/2017 09:06 PM    Nitrites NEGATIVE  11/04/2017 09:06 PM    Leukocyte Esterase NEGATIVE  11/04/2017 09:06 PM    Epithelial cells FEW 11/04/2017 09:06 PM    Bacteria NEGATIVE  11/04/2017 09:06 PM    WBC 0-4 11/04/2017 09:06 PM    RBC 0-5 11/04/2017 09:06 PM         Medications Reviewed:     Current Facility-Administered Medications   Medication Dose Route Frequency    carbidopa-levodopa (SINEMET)  mg per tablet 1 Tab  1 Tab Oral TID    pantoprazole (PROTONIX) tablet 40 mg  40 mg Oral DAILY    safinamide (Xadago) tab 100 mg  (Patient Supplied)  100 mg Oral DAILY    sodium chloride (NS) flush 5-10 mL  5-10 mL IntraVENous Q8H    sodium chloride (NS) flush 5-10 mL  5-10 mL IntraVENous PRN    acetaminophen (TYLENOL) tablet 650 mg  650 mg Oral Q4H PRN     ______________________________________________________________________  EXPECTED LENGTH OF STAY: 3d 0h  ACTUAL LENGTH OF STAY:          Minoo Lund MD

## 2018-04-10 NOTE — PROGRESS NOTES
Problem: Mobility Impaired (Adult and Pediatric)  Goal: *Acute Goals and Plan of Care (Insert Text)  Physical Therapy Goals  Initiated 4/8/2018  1. Patient will move from supine to sit and sit to supine  in bed with supervision/set-up within 7 day(s). 2.  Patient will transfer from bed to chair and chair to bed with supervision/set-up using the least restrictive device within 7 day(s). 3.  Patient will perform sit to stand with supervision/set-up within 7 day(s). 4.  Patient will ambulate with supervision/set-up for 300 feet with the least restrictive device within 7 day(s). 5.  Patient will ascend/descend 6 stairs with one handrail(s) with supervision/set-up within 7 day(s). physical Therapy TREATMENT  Patient: Loni Murray (37 y.o. male)  Date: 4/10/2018  Diagnosis: Fall Parkinson disease [facility])       Precautions: Fall, NWB (NWB L UE (in sling))  Chart, physical therapy assessment, plan of care and goals were reviewed. ASSESSMENT:  Patient making good progress toward goals. Patient currently needing supervision for bed mobility and needs extra time to complete task. Sit to stand with CGA and fair initial standing balance. Amb approx 125 feet with SPC and Monty. Patient with shuffling gait pattern and at times has anterior LOB needing Motny to recover. Patient is a high fall risk and is not safe to DC home at this time without 24 hr supervision/assist.  Recommend SNF level rehab at [place] to continue mobility progression. Progression toward goals:  []    Improving appropriately and progressing toward goals  [x]    Improving slowly and progressing toward goals  []    Not making progress toward goals and plan of care will be adjusted     PLAN:  Patient continues to benefit from skilled intervention to address the above impairments. Continue treatment per established plan of care.   Discharge Recommendations:  Skilled Nursing Facility  Further Equipment Recommendations for Discharge:  TBSTEVE SUBJECTIVE:   Patient stated I'm hoping to go to rehab. My balance is not very good.     OBJECTIVE DATA SUMMARY:   Critical Behavior:  Neurologic State: Alert, Appropriate for age  Orientation Level: Oriented to person, Oriented to place, Oriented to situation, Disoriented to time  Cognition: Memory loss, Follows commands, Appropriate decision making  Safety/Judgement: Fall prevention  Functional Mobility Training:  Bed Mobility:     Supine to Sit: Stand-by assistance  Sit to Supine: Stand-by assistance           Transfers:  Sit to Stand: Contact guard assistance  Stand to Sit: Contact guard assistance                             Balance:  Sitting: Intact  Standing: Impaired; With support  Standing - Static: Constant support; Fair  Standing - Dynamic : Fair  Ambulation/Gait Training:  Distance (ft): 125 Feet (ft)  Assistive Device: Cane, straight;Gait belt  Ambulation - Level of Assistance: Minimal assistance        Gait Abnormalities: Antalgic;Decreased step clearance;Shuffling gait        Base of Support: Narrowed     Speed/Amisha: Slow  Step Length: Left shortened;Right shortened       Pain:  Pain Scale 1: Numeric (0 - 10)  Pain Intensity 1: 0              Activity Tolerance:   VSS  Please refer to the flowsheet for vital signs taken during this treatment.   After treatment:   []    Patient left in no apparent distress sitting up in chair  [x]    Patient left in no apparent distress in bed  [x]    Call bell left within reach  [x]    Nursing notified  []    Caregiver present  [x]    Bed alarm activated    COMMUNICATION/COLLABORATION:   The patients plan of care was discussed with: Physical Therapist and Registered Nurse    Leo Perrin, PT, DPT   Time Calculation: 12 mins

## 2018-04-10 NOTE — PROGRESS NOTES
[facility] FRACTURE PROGRESS NOTE    April 10, 2018  Admit Date:   2018    Post Op day: * No surgery found *    Subjective: Ronal Noyola states that he is feeling well overall. Some soreness in arm but no new pain. Remains in splint and sling. Denies tingling and numbness. Tolerating diet  Denies N/V/SOB or CP     PT/OT:   Gait:  Gait  Base of Support: Narrowed  Speed/Amisha: Shuffled, Slow  Step Length: Right shortened, Left shortened  Gait Abnormalities: Decreased step clearance, Shuffling gait  Ambulation - Level of Assistance: Minimal assistance  Distance (ft): 130 Feet (ft)  Assistive Device: Gait belt, Cane, straight                 Vital Signs:    Patient Vitals for the past 8 hrs:   BP Temp Pulse Resp SpO2   04/10/18 0818 124/74 98.2 °F (36.8 °C) 93 16 97 %     Temp (24hrs), Av.3 °F (36.8 °C), Min:98.1 °F (36.7 °C), Max:98.9 °F (37.2 °C)      Pain Control:   Pain Assessment  Pain Scale 1: Numeric (0 - 10)  Pain Intensity 1: 0  Pain Location 1: Wrist  Pain Orientation 1: Left    Meds:    Current Facility-Administered Medications   Medication Dose Route Frequency    carbidopa-levodopa (SINEMET)  mg per tablet 1 Tab  1 Tab Oral TID    pantoprazole (PROTONIX) tablet 40 mg  40 mg Oral DAILY    safinamide (Xadago) tab 100 mg  (Patient Supplied)  100 mg Oral DAILY    sodium chloride (NS) flush 5-10 mL  5-10 mL IntraVENous Q8H    sodium chloride (NS) flush 5-10 mL  5-10 mL IntraVENous PRN    acetaminophen (TYLENOL) tablet 650 mg  650 mg Oral Q4H PRN       LAB:    Recent Labs      18   1317   HCT  38.4   HGB  11.7*       Transfuse PRBC's:      Assessment & Physician's Comment:  Left arm splint intact  Left hand with noted swelling but no ecchymosis;  Able to make weak fist due to swelling  Dressing is clean, dry, and intact  Neurovascular checks within normal limits  Orientation:  Oriented    Principal Problem:    Parkinson disease ([address] (2017)    Active Problems:    Fall (4/7/2018)        Plan:    Cont splint and sling  Elevate left arm when able to reduce swelling  Cont current pain management   Will need transition to arm cast later this week as outpatient  Remains a fall risk due to parkinson's   Discharge per Medicine team    NAKUL Almanza 51

## 2018-04-10 NOTE — PROGRESS NOTES
Problem: Self Care Deficits Care Plan (Adult)  Goal: *Acute Goals and Plan of Care (Insert Text)  Occupational Therapy Goals  Initiated 4/9/2018     1. Patient will don/doff arm sling at supervision/set-up level within 7 days. 2.  Patient will perform L shoulder/elbow exercises per physician order with supervision/set-up within 7 days. 3.  Patient will perform upper body ADLs with supervision/set-up  within 7 days. 4. Patient will perform lower body dressing with supervision/set-up within 7 days. 5.  Patient will perform toilet transfers with independence using  [address] within 7 days. 6.  Patient will verbalize/demonstrate wrist precautions (NWB) during ADLs with 100% accuracy within 7 days. Occupational Therapy TREATMENT  Patient: Diamond Royal (85 y.o. male)  Date: 4/10/2018  Diagnosis: Fall Parkinson disease [facility])       Precautions: Fall, NWB (NWB L UE (in sling))  Chart, occupational therapy assessment, plan of care, and goals were reviewed. ASSESSMENT:  Patient received supine in bed, appearing less perseverative with conversation today but remaining confused to full situation and time with impaired attention span (<30 seconds). Patient educated on LUE exercises, requiring Min-Mod A for shoulder and elbow d/t weakness and attention/concentration limitations. Patient assisted with simple grooming tasks and demonstrating increased independence with familiar tasks. Progression toward goals:  []       Improving appropriately and progressing toward goals  []       Improving slowly and progressing toward goals  []       Not making progress toward goals and plan of care will be adjusted     PLAN:  Patient continues to benefit from skilled intervention to address the above impairments. Continue treatment per established plan of care. Discharge Recommendations:  Rehab  Further Equipment Recommendations for Discharge:  TBD     SUBJECTIVE:   Patient stated What day is it? Brijesh Weathers    OBJECTIVE DATA SUMMARY: Cognitive/Behavioral Status:  Neurologic State: Alert; Appropriate for age  Orientation Level: Oriented to person;Oriented to place;Oriented to situation;Disoriented to time  Cognition: Memory loss; Follows commands; Appropriate decision making             Functional Mobility and Transfers for ADLs:  Bed Mobility:  Supine to Sit: Stand-by assistance  Sit to Supine: Stand-by assistance    Transfers:  Sit to Stand: Contact guard assistance          Balance:  Sitting: Intact  Standing: Impaired; With support  Standing - Static: Constant support; Fair  Standing - Dynamic : Fair    ADL Intervention:     Grooming  Washing Face: Supervision/set-up    Therapeutic Exercises:     EXERCISE   Sets   Reps   Active Active Assist   Passive   Comments   Scapular elevation 1 5 []           [x]           []              Shoulder flexion 1 10 []           [x]           []              Elbow ext/flex 1 10 []           [x]           []               1 10 [x]           []           []           With towel roll in hand   * Patient completed with assist for weakness and impaired cognition (less than 30 second attention span)    Pain:  Pain Scale 1: Numeric (0 - 10)  Pain Intensity 1: 0              Activity Tolerance:   Good. Please refer to the flowsheet for vital signs taken during this treatment.   After treatment:   [] Patient left in no apparent distress sitting up in chair  [x] Patient left in no apparent distress in bed  [x] Call bell left within reach  [x] Nursing notified  [] Caregiver present  [] Bed alarm activated - disconnected upon arrival    COMMUNICATION/COLLABORATION:   The patients plan of care was discussed with: Physical Therapist and Registered Nurse    Zacarias Negron OT  Time Calculation: 11 mins

## 2018-04-10 NOTE — PROGRESS NOTES
Bedside shift change report given to [address] (oncoming nurse) by Hal Godwin (offgoing nurse). Report included the following information SBAR, Kardex, ED Summary, Intake/Output, MAR and Recent Results.

## 2018-04-10 NOTE — PROGRESS NOTES
Bedside and Verbal shift change report given to [facility] (oncoming nurse) by Bonnetta Moritz RN (offgoing nurse). Report included the following information SBAR, Kardex, Intake/Output and MAR.

## 2018-04-10 NOTE — PROGRESS NOTES
SOHAN spoke with Latrice in admissions at [address] this am. They started the insurance authorization, but realized they do not contract with [employer]. CRM left a VM with the [employer]  to see which facilities on the [facility] have the contract. Jayla Boland [phone].) Will follow. KJT    CRM called [employer] back at [phone] and spoke with [facility]. She stated that they contract with [facility], [facility], and [facility]. CRM will contact the son to discuss. MIHIR    12:28pm. CRM spoke with the son Prashant Mendoza [phone] and explained the SNF options. He plans to talk with his wife/research the facilities and get back to [address] today. Will follow. 1:48pm: CRM spoke with the patient's son Prashant Mendoza. He has chosen [facility]. Referral sent via [address] [place] [address] Ave. This on is requesting a neurology consult for medication adjustment prior to rehab. He also stated that the drug Valerie Gondola will not be covered under the patient's insurance after 30 days. CRM will discuss with Dr. Jacky Arzate. KJT    CRM spoke with Admissions at [facility]. They plan to reach out to the son and start the insurance authorization for admission. SOHAN spoke with Dr. Jacky Arzate and neurology has seen and does not plan to change any of this patient's medications. They are recommending out patient follow up. CRM will update the son. MIHIR PARRY spoke with Patricia Wellington from [facility]. They have accepted and will plan on admission tomorrow. MIHIR PARRY spoke with Sandy Jeong, the patient's daughter in law. She is aware of the above and will inform her  about the discharge tomorrow. They family has requested ambulance transport.  SOHAN will call Harvey Kolb with [facility] to set up transport in the am. Rohini Ledezma

## 2018-04-11 NOTE — PROGRESS NOTES
Hospitalist Progress Note  Maralyn Prader, MD  Answering service: [phone] OR [phone] from in house phone  Cell: [phone]      Date of Service:  2018  NAME:  Ramya MISHRA:  1950  MRN:  [id number]      Admission Summary:   Patient is a 50-year-old male with a diagnosis of Parkinson's, brought in by son with the complaints of multiple falls in the last few weeks. The patient also fell this morning, hurt his wrist, and was found to have a nondisplaced left ulnar fracture. The patient said that recently he is unable to be steady on his feet. He had a couple of visits for medication adjustment with Dr. Nikko Quezada but did not help. Today, he felt much worse. His wife and the family cannot take care of him, so they decided to come to the ER. He said that his fall was accidental in nature when he was trying to sit. He just fell on the ground and he did not lose his consciousness. He has a small healing scab on the side of the head from the fall earlier this week. He also was complaining of wrist pain. A CT scan was done. No acute intracranial hemorrhage was found. He was admitted for further management.     Interval history / Subjective:     F/u multiple falls  The patient has fallen atleast 4 times in last one year  Feels better  No pain     Assessment & Plan:     Left ulnar non displaced fracture  -now s/p sling  -Appreciate Ortho, outpatient follow up  -Transition to cast next week in the clinic    Parkinson's disease  -Outpatient follow up with Neurology  -Appreciate discussion with in house neurology, no change in medicines    Frequent falls  -likely sec to above  -CT head  No acute intracranial hemorrhage, mass or infarct  -PT/OT, likely would benefit from SNF    Hyponatremia  -improving with fluids    Cardiac diet    PT/OT rehab    Code status: full code  DVT prophylaxis: scd  PTA: home    Plan: rehab ?today    Care Plan discussed with: Patient/Family  Disposition: TBD     Hospital Problems  Date Reviewed: 4/7/2018          Codes Class Noted POA    Fall ICD-10-CM: W19. Nikko Yañez  ICD-9-CM: E888.9  4/7/2018 Unknown        * (Principal)Parkinson disease [facility]) ICD-10-CM: G20  ICD-9-CM: 332.0  12/13/2017 Yes                Review of Systems:   A comprehensive review of systems was negative except for that written in the HPI. Vital Signs:    Last 24hrs VS reviewed since prior progress note. Most recent are:  Visit Vitals    /73    Pulse 85    Temp 98.6 °F (37 °C)    Resp 16    Ht 5' 10\" (1.778 m)    Wt 86.2 kg (190 lb)    SpO2 95%    BMI 27.26 kg/m2         Intake/Output Summary (Last 24 hours) at 04/11/18 6421  Last data filed at 04/10/18 1406   Gross per 24 hour   Intake              240 ml   Output                0 ml   Net              240 ml        Physical Examination:             Constitutional:  No acute distress, cooperative, pleasant    ENT:  Oral mucous moist, oropharynx benign. Neck supple,    Resp:  CTA bilaterally. No wheezing/rhonchi/rales. No accessory muscle use   CV:  Regular rhythm, normal rate, no murmurs, gallops, rubs    GI:  Soft, non distended, non tender. normoactive bowel sounds, no hepatosplenomegaly     Musculoskeletal:  No edema, warm, 2+ pulses throughout    Neurologic:  Moves all extremities. AAOx3, CN II-XII reviewed            Data Review:    Review and/or order of clinical lab test      Labs:     No results for input(s): WBC, HGB, HCT, PLT, HGBEXT, HCTEXT, PLTEXT, HGBEXT, HCTEXT, PLTEXT in the last 72 hours. No results for input(s): NA, K, CL, CO2, BUN, CREA, GLU, CA, MG, PHOS, URICA in the last 72 hours. No results for input(s): SGOT, GPT, ALT, AP, TBIL, TBILI, TP, ALB, GLOB, GGT, AML, LPSE in the last 72 hours. No lab exists for component: AMYP, HLPSE  No results for input(s): INR, PTP, APTT in the last 72 hours.     No lab exists for component: INREXT, INREXT   No results for input(s): FE, TIBC, PSAT, FERR in the last 72 hours. No results found for: FOL, RBCF   No results for input(s): PH, PCO2, PO2 in the last 72 hours. No results for input(s): CPK, CKNDX, TROIQ in the last 72 hours.     No lab exists for component: CPKMB  Lab Results   Component Value Date/Time    Cholesterol, total 103 11/01/2017 11:51 AM    HDL Cholesterol 11 (L) 11/01/2017 11:51 AM    LDL, calculated 38 11/01/2017 11:51 AM    Triglyceride 269 (H) 11/01/2017 11:51 AM     Lab Results   Component Value Date/Time    Glucose (POC) 85 04/09/2018 10:00 PM    Glucose (POC) 78 04/09/2018 09:45 PM     Lab Results   Component Value Date/Time    Color YELLOW/STRAW 11/04/2017 09:06 PM    Appearance CLEAR 11/04/2017 09:06 PM    Specific gravity 1.016 11/04/2017 09:06 PM    pH (UA) 5.0 11/04/2017 09:06 PM    Protein NEGATIVE  11/04/2017 09:06 PM    Glucose NEGATIVE  11/04/2017 09:06 PM    Ketone NEGATIVE  11/04/2017 09:06 PM    Bilirubin NEGATIVE  11/04/2017 09:06 PM    Urobilinogen 0.2 11/04/2017 09:06 PM    Nitrites NEGATIVE  11/04/2017 09:06 PM    Leukocyte Esterase NEGATIVE  11/04/2017 09:06 PM    Epithelial cells FEW 11/04/2017 09:06 PM    Bacteria NEGATIVE  11/04/2017 09:06 PM    WBC 0-4 11/04/2017 09:06 PM    RBC 0-5 11/04/2017 09:06 PM         Medications Reviewed:     Current Facility-Administered Medications   Medication Dose Route Frequency    carbidopa-levodopa (SINEMET)  mg per tablet 1 Tab  1 Tab Oral TID    pantoprazole (PROTONIX) tablet 40 mg  40 mg Oral DAILY    safinamide (Xadago) tab 100 mg  (Patient Supplied)  100 mg Oral DAILY    sodium chloride (NS) flush 5-10 mL  5-10 mL IntraVENous Q8H    sodium chloride (NS) flush 5-10 mL  5-10 mL IntraVENous PRN    acetaminophen (TYLENOL) tablet 650 mg  650 mg Oral Q4H PRN     ______________________________________________________________________  EXPECTED LENGTH OF STAY: 3d 0h  ACTUAL LENGTH OF STAY:          MD Abraham

## 2018-04-11 NOTE — PROGRESS NOTES
Bedside shift change report given to Mago BLANCHARD (oncoming nurse) by Latonia Winston (offgoing nurse). Report included the following information SBAR, Kardex, Intake/Output, MAR and Recent Results.

## 2018-04-11 NOTE — DISCHARGE SUMMARY
Discharge Summary       PATIENT ID: Verito Lopez  MRN: [id number]   YOB: 1950    DATE OF ADMISSION: 4/7/2018 12:49 PM    DATE OF DISCHARGE: 4/11/2018   PRIMARY CARE PROVIDER: Lilo Sosa MD     ATTENDING PHYSICIAN: Dr Latisha Borden  DISCHARGING PROVIDER: Latisha Borden MD    To contact this individual call [phone] and ask the  to page. If unavailable ask to be transferred the Adult Hospitalist Department. CONSULTATIONS: IP CONSULT TO HOSPITALIST  IP CONSULT TO ORTHOPEDIC SURGERY  IP CONSULT TO NEUROLOGY    PROCEDURES/SURGERIES: * No surgery found *    ADMITTING [address] [place] [address] COURSE:   Left ulnar non displaced fracture  -now s/p sling  -Appreciate Ortho, outpatient follow up  -Transition to cast next week in the clinic    Parkinson's disease  -Outpatient follow up with Neurology  -Appreciate discussion with in house neurology, no change in medicines    Frequent falls  -likely sec to above  -CT head 4/7 No acute intracranial hemorrhage, mass or infarct  -PT/OT, likely would benefit from SNF    Hyponatremia  -improving with fluids    Cardiac diet    PT/OT rehab    Code status: full code  DVT prophylaxis: scd  PTA: home        [address] / PLAN:      1. Left ulnar fracture  2. Generalized weakness       PENDING TEST RESULTS:   At the time of discharge the following test results are still pending: none    FOLLOW UP APPOINTMENTS:    Follow-up Information     Follow up With Details Comments Contact Info    Bahman Dubon MD Go on 5/1/2018 hospital PCP f/u appointment on Tuesday May 1, 2018 @ 11:20 a.m. If patient is unable to attend, please call the office.  [address] [place] [address] Rd  [phone]             ADDITIONAL CARE RECOMMENDATIONS:   Follow up with PMD in 1 week    DIET: Cardiac Diet    ACTIVITY: Activity as tolerated and recommended by PT    DISCHARGE MEDICATIONS:   See Medication Reconciliation Form      NOTIFY YOUR PHYSICIAN FOR ANY OF THE FOLLOWING:   Fever over 101 degrees for 24 hours. Chest pain, shortness of breath, fever, chills, nausea, vomiting, diarrhea, change in mentation, falling, weakness, bleeding. Severe pain or pain not relieved by medications. Or, any other signs or symptoms that you may have questions about. DISPOSITION:    Home With:   OT  PT  HH  RN      xx SNF/Inpatient Rehab/LTAC    Independent/assisted living    Hospice    Other:       PATIENT CONDITION AT DISCHARGE:     Functional status    Poor    x Deconditioned     Independent      Cognition   x  Lucid     Forgetful     Dementia      Catheters/lines (plus indication)    Tse     PICC     PEG    x None      Code status    x Full code     DNR      PHYSICAL EXAMINATION AT DISCHARGE:  Please see progress note        CHRONIC MEDICAL DIAGNOSES:  Problem List as of 4/11/2018  Date Reviewed: 4/7/2018          Codes Class Noted - Resolved    Fall ICD-10-CM: W19. Nikhillalyanna CisnerosDebbi  ICD-9-CM: E888.9  4/7/2018 - Present        * (Principal)Parkinson disease [facility]) ICD-10-CM: Thersa Ocean  ICD-9-CM: 332.0  12/13/2017 - Present              Greater than 35 minutes were spent with the patient on counseling and coordination of care    Signed:   Bisi Dobbins MD  4/11/2018  9:43 AM

## 2018-04-11 NOTE — PROGRESS NOTES
Bedside and Verbal shift change report given to Regina Barkley (oncoming nurse) by Miryam Reza (offgoing nurse). Report included the following information SBAR, Kardex, Intake/Output, MAR and Recent Results.

## 2018-04-11 NOTE — PROGRESS NOTES
SOHAN spoke with María Elena Musa with [facility] [phone]. She has provided the auth # [id number] for ambulance transport ([address] [place] [address],[address] is their contract) [phone]. 2pm transport time confirmed. Discharge folder with report # located on the hard chart. CRM will contact the family.  MIHIR

## 2018-04-11 NOTE — DISCHARGE INSTRUCTIONS
Discharge SNF/Rehab Instructions/LTAC       PATIENT ID: Michael Daly  MRN: [id number]   YOB: 1950    DATE OF ADMISSION: 4/7/2018 12:49 PM    DATE OF DISCHARGE: 4/11/2018    PRIMARY CARE PROVIDER: Joanna Sosa MD       ATTENDING PHYSICIAN: Iman Malik MD  DISCHARGING PROVIDER: Iman Malik MD     To contact this individual call [phone] and ask the  to page. If unavailable ask to be transferred the Adult Hospitalist Department. CONSULTATIONS: IP CONSULT TO HOSPITALIST  IP CONSULT TO ORTHOPEDIC SURGERY  IP CONSULT TO NEUROLOGY    PROCEDURES/SURGERIES: * No surgery found *    ADMITTING [address] [place] [address] COURSE:   Left ulnar non displaced fracture  -now s/p sling  -Appreciate Ortho, outpatient follow up  -Transition to cast next week in the clinic    Parkinson's disease  -Outpatient follow up with Neurology  -Appreciate discussion with in house neurology, no change in medicines    Frequent falls  -likely sec to above  -CT head 4/7 No acute intracranial hemorrhage, mass or infarct  -PT/OT, likely would benefit from SNF    Hyponatremia  -improving with fluids    Cardiac diet    PT/OT rehab    Code status: full code  DVT prophylaxis: scd  PTA: home        [address] / PLAN:      1. Left ulnar fracture  2. Generalized weakness       PENDING TEST RESULTS:   At the time of discharge the following test results are still pending: none    FOLLOW UP APPOINTMENTS:    Follow-up Information     Follow up With Details Comments Contact Info    Wyatt Scott MD Go on 5/1/2018 hospital PCP f/u appointment on Tuesday May 1, 2018 @ 11:20 a.m. If patient is unable to attend, please call the office.  [address]  [address]  [phone]             ADDITIONAL CARE RECOMMENDATIONS:   Follow up with PMD in 1 week    DIET: Cardiac Diet    ACTIVITY: Activity as tolerated and recommended by PT    DISCHARGE MEDICATIONS:   See Medication Reconciliation Form      NOTIFY YOUR PHYSICIAN FOR ANY OF THE FOLLOWING:   Fever over 101 degrees for 24 hours. Chest pain, shortness of breath, fever, chills, nausea, vomiting, diarrhea, change in mentation, falling, weakness, bleeding. Severe pain or pain not relieved by medications. Or, any other signs or symptoms that you may have questions about. DISPOSITION:    Home With:   OT  PT  HH  RN      xx SNF/Inpatient Rehab/LTAC    Independent/assisted living    Hospice    Other:       PATIENT CONDITION AT DISCHARGE:     Functional status    Poor    x Deconditioned     Independent      Cognition   x  Lucid     Forgetful     Dementia      Catheters/lines (plus indication)    Tse     PICC     PEG    x None      Code status    x Full code     DNR      PHYSICAL EXAMINATION AT DISCHARGE:  Please see progress note      CHRONIC MEDICAL DIAGNOSES:  Problem List as of 4/11/2018  Date Reviewed: 4/7/2018          Codes Class Noted - Resolved    Fall ICD-10-CM: W19. Farrgabriela Ch  ICD-9-CM: E888.9  4/7/2018 - Present        * (Principal)Parkinson disease ([facility] ICD-10-CM: Mendez Bruce  ICD-9-CM: 332.0  12/13/2017 - Present                CDMP Checked:   Yes x     PROBLEM LIST Updated:  Yes x         Signed:   Coleen Collins MD  4/11/2018  9:41 AM

## 2018-04-11 NOTE — PROGRESS NOTES
Report called after several calls and no pickup of phone at [facility]. Spoke with nurse Regine Jones. Report given per sbar and all document sent with the patient. A home medication was not sent but I call patient's son and they will decide if they want to pick it up. Med left at charge nurse desk. All other personal belongings sent with the patient.

## 2018-04-12 NOTE — PATIENT INSTRUCTIONS

## 2018-04-12 NOTE — MR AVS SNAPSHOT
[address] [place] [address] [address] [facility] [id number] [phone] Patient: Melva Bocanegra MRN: [id number] Visit Information Date & Time Provider Department Dept. Phone Encounter #  
 4/12/2018 10:00 AM Fantasma Arroyo NP [facility] [phone] [id number] Your Appointments 5/1/2018 11:20 AM  
ACUTE CARE with Honey Sosa MD  
[address] [facility] Appt Note: hospital f/u SCOTT from SNF: fall due to Parkinson's disease  
 [address] [address] [phone]  
  
   
 [address] [address]  
  
    
 6/13/2018  1:40 PM  
Follow Up with Wilber Navarro MD  
[address] [facility] Appt Note: 6 month f/u Parkinsons [address] [facility] [address] [address]  
[facility] [address] [address] Upcoming Health Maintenance Date Due Hepatitis C Screening 1950 DTaP/Tdap/Td series (1 - Tdap) 3/5/1972 FOBT Q 1 YEAR AGE 50-75 3/5/2001 ZOSTER VACCINE AGE 60> 1/5/2011 GLAUCOMA SCREENING Q2Y 3/5/2016 Pneumococcal 65+ Low/Medium Risk (2 of 2 - PCV13) 10/10/2017 MEDICARE YEARLY EXAM 3/14/2018 Allergies as of 4/12/2018  Review Complete On: 4/12/2018 By: Fantasma Arroyo NP No Known Allergies Current Immunizations  Never Reviewed Name Date Influenza High Dose Vaccine PF 10/10/2017  3:22 PM  
 Pneumococcal Polysaccharide (PPSV-23) 10/10/2016 Not reviewed this visit You Were Diagnosed With   
  
 Codes Comments Parkinson's disease ([facility] [address]    -  Primary ICD-10-CM: G20 
ICD-9-CM: 332.0 Memory loss     ICD-10-CM: R41.3 ICD-9-CM: 780.93 Fall, subsequent encounter     ICD-10-CM: W19. Luis Alicia ICD-9-CM: V58.89, E888.9 Low sodium levels     ICD-10-CM: E87.1 ICD-9-CM: 276.1 Anemia, unspecified type     ICD-10-CM: D64.9 ICD-9-CM: 137. 9 Vitals BP Pulse Resp Height(growth percentile) Weight(growth percentile) SpO2  
 118/70 (!) 103 20 5' 10\" (1.778 m) 190 lb (86.2 kg) 95% BMI Smoking Status 27.26 kg/m2 Never Smoker Vitals History BMI and BSA Data Body Mass Index Body Surface Area  
 27.26 kg/m 2 2.06 m 2 Preferred Pharmacy Pharmacy Name Phone [facility] [phone] Your Updated Medication List  
  
   
This list is accurate as of 18 11:15 AM.  Always use your most recent med list.  
  
  
  
  
 carbidopa-levodopa  mg per tablet Commonly known as:  SINEMET  
1.5 tablets po every four hour four times a day. Indications: Parkinsonism FISH -1,400 mg Cpdr  
Generic drug:  omega 3-dha-epa-fish oil Take 1 Cap by mouth daily. pantoprazole 40 mg tablet Commonly known as:  PROTONIX Take 1 Tab by mouth daily. Prescriptions Printed Refills  
 carbidopa-levodopa (SINEMET)  mg per tablet 5 Si.5 tablets po every four hour four times a day. Indications: Parkinsonism Class: Print We Performed the Following CBC WITH AUTOMATED DIFF [79753 CPT(R)] METABOLIC PANEL, COMPREHENSIVE [21230 CPT(R)] TSH AND FREE T4 [87753 CPT(R)] VITAMIN B12 [id number] CPT(R)] To-Do List   
 2018 Imaging:  MRI BRAIN W WO CONT Referral Information Referral ID Referred By Referred To  
  
 [id number] Samir Florian Not Available Visits Status Start Date End Date 1 New Request 18 If your referral has a status of pending review or denied, additional information will be sent to support the outcome of this decision. Patient Instructions A Healthy Lifestyle: Care Instructions Your Care Instructions A healthy lifestyle can help you feel good, stay at a healthy weight, and have plenty of energy for both work and play. A healthy lifestyle is something you can share with your whole family. A healthy lifestyle also can lower your risk for serious health problems, such as high blood pressure, heart disease, and diabetes. You can follow a few steps listed below to improve your health and the health of your family. Follow-up care is a key part of your treatment and safety. Be sure to make and go to all appointments, and call your doctor if you are having problems. It's also a good idea to know your test results and keep a list of the medicines you take. How can you care for yourself at home? · Do not eat too much sugar, fat, or fast foods. You can still have dessert and treats now and then. The goal is moderation. · Start small to improve your eating habits. Pay attention to portion sizes, drink less juice and soda pop, and eat more fruits and vegetables. ¨ Eat a healthy amount of food. A 3-ounce serving of meat, for example, is about the size of a deck of cards. Fill the rest of your plate with vegetables and whole grains. ¨ Limit the amount of soda and sports drinks you have every day. Drink more water when you are thirsty. ¨ Eat at least 5 servings of fruits and vegetables every day. It may seem like a lot, but it is not hard to reach this goal. A serving or helping is 1 piece of fruit, 1 cup of vegetables, or 2 cups of leafy, raw vegetables. Have an apple or some carrot sticks as an afternoon snack instead of a candy bar. Try to have fruits and/or vegetables at every meal. 
· Make exercise part of your daily routine. You may want to start with simple activities, such as walking, bicycling, or slow swimming. Try to be active 30 to 60 minutes every day. You do not need to do all 30 to 60 minutes all at once.  For example, you can exercise 3 times a day for 10 or 20 minutes. Moderate exercise is safe for most people, but it is always a good idea to talk to your doctor before starting an exercise program. 
· Keep moving. Anupam Stone the lawn, work in the garden, or [employer] Take the stairs instead of the elevator at work. · If you smoke, quit. People who smoke have an increased risk for heart attack, stroke, cancer, and other lung illnesses. Quitting is hard, but there are ways to boost your chance of quitting tobacco for good. ¨ Use nicotine gum, patches, or lozenges. ¨ Ask your doctor about stop-smoking programs and medicines. ¨ Keep trying. In addition to reducing your risk of diseases in the future, you will notice some benefits soon after you stop using tobacco. If you have shortness of breath or asthma symptoms, they will likely get better within a few weeks after you quit. · Limit how much alcohol you drink. Moderate amounts of alcohol (up to 2 drinks a day for men, 1 drink a day for women) are okay. But drinking too much can lead to liver problems, high blood pressure, and other health problems. Family health If you have a family, there are many things you can do together to improve your health. · Eat meals together as a family as often as possible. · Eat healthy foods. This includes fruits, vegetables, lean meats and dairy, and whole grains. · Include your family in your fitness plan. Most people think of activities such as jogging or tennis as the way to fitness, but there are many ways you and your family can be more active. Anything that makes you breathe hard and gets your heart pumping is exercise. Here are some tips: 
¨ Walk to do errands or to take your child to school or the bus. ¨ Go for a family bike ride after dinner instead of watching TV. Where can you learn more? Go to http://marli-jeremy.info/. Enter X433 in the search box to learn more about \"A Healthy Lifestyle: Care Instructions. \" Current as of: May 12, 2017 Content Version: 11.4 © 5858-9825 [employer]. Care instructions adapted under license by [employer] (which disclaims liability or warranty for this information). If you have questions about a medical condition or this instruction, always ask your healthcare professional. [address] any warranty or liability for your use of this information. Introducing [facility]! Dear Chaparro Douglass: 
Thank you for requesting a [employer] account. Our records indicate that you already have an active [employer] account. You can access your account anytime at [email or web address] Did you know that you can access your hospital and ER discharge instructions at any time in [employer]? You can also review all of your test results from your hospital stay or ER visit. Additional Information If you have questions, please visit the Frequently Asked Questions section of the [employer] website at [email or web address] Remember, [employer] is NOT to be used for urgent needs. For medical emergencies, dial 911. Now available from your iPhone and Android! Please provide this summary of care documentation to your next provider. Your primary care clinician is listed as Kamlesh Sosa. If you have any questions after today's visit, please call [phone].
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
## 2018-04-12 NOTE — PROGRESS NOTES
Hospital Discharge Follow-Up      Date/Time:  4/12/2018 11:07 AM    Patient was admitted to [facility] on 4/7/18 and discharged on 4/11/18 for Parkinson's Disease. Patient was discharged to [facility]. The physician discharge summary was available at the time of outreach. Inpatient Rehab Facility was contacted within 1 business day of discharge. Inpatient RRAT score: 8  Was this a readmission? no   Patient stated reason for the readmission: n/a    Call placed to [address] [phone]). Charge nurse on patient's unit faxing copy of MAR for NN to complete medication reconciliation. Also left message with the  for return call regarding discharge planning. Medication:     New Medications at Discharge: none  Changed Medications at Discharge: none  Discontinued Medications at Discharge: Carbidopa-levodopa 25-100mg, Safinamide 100mg    Medication reconciliation was performed with [place] MAR. Safinamide Mesylate 100mg daily [facility] This was discussed at the patient's neurology appointment today. Patient stopped this mediation due to not seeing any improvements while taking it and also cost. Medication was d/c'd from med rec by neurology NP Gordon Record. Raj called and updated that medication should be removed from [facility]. Charge RN Olman Carroll states she will remove the medication. Referral to Pharm D needed: no     Current Outpatient Prescriptions   Medication Sig    pantoprazole (PROTONIX) 40 mg tablet Take 1 Tab by mouth daily.  omega 3-dha-epa-fish oil (FISH OIL) 900-1,400 mg cpDR Take 1 Cap by mouth daily.  carbidopa-levodopa (SINEMET)  mg per tablet Take 1 Tab by mouth three (3) times daily. Indications: Parkinsonism     No current facility-administered medications for this visit. There are no discontinued medications.     PCP/Specialist follow up: Future Appointments  Date Time Provider Nathan Ordaz 5/1/2018 11:20 AM Francesca Sosa MD [facility] SCHED   6/13/2018 1:40 PM Destiny Parrish  [facility] call from discharge planner at [facility]. Will call for SCOTT follow-up with patient upon discharge.

## 2018-04-23 ENCOUNTER — TELEPHONE (OUTPATIENT)
Dept: NEUROLOGY | Age: 68
End: 2018-04-23

## 2018-04-23 ENCOUNTER — TELEPHONE (OUTPATIENT)
Dept: FAMILY MEDICINE CLINIC | Age: 68
End: 2018-04-23

## 2018-04-23 NOTE — TELEPHONE ENCOUNTER
----- Message from Moris Bravo sent at 4/23/2018  1:09 PM EDT -----  Regarding: Dr. Dave Kumar ([facility]) called to inform the practice that pt passed away on 04/20/18

## 2018-04-23 NOTE — TELEPHONE ENCOUNTER
----- Message from Sandra Blackwell sent at 4/23/2018 11:49 AM EDT -----  Regarding: GEOVANNI Rocha/telephone  Pt's son Tom Cornell, pt;s son  [phone], wanted to let GEOVANNI Reyes and Dr Rogelio Guzman know his father passed away on this past  Friday 4/20/18 at [facility] ER

## 2018-04-24 ENCOUNTER — PATIENT OUTREACH (OUTPATIENT)
Dept: FAMILY MEDICINE CLINIC | Age: 68
End: 2018-04-24

## 2018-04-24 NOTE — PROGRESS NOTES
Nurse Navigator Documentation      Date/Time:  2018 10:53 AM    Notified that patient  at [address] ED on 18, patient was being admitted for sepsis. Records printed for Dr. Evelio Lam to review. SCOTT episode closed.

## 2018-04-24 NOTE — TELEPHONE ENCOUNTER
Patient  in [facility] ED. Records printed for Dr. Ernestine Perrin to review.  ANDREA
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
